# Patient Record
Sex: FEMALE | Race: WHITE | NOT HISPANIC OR LATINO | Employment: OTHER | URBAN - METROPOLITAN AREA
[De-identification: names, ages, dates, MRNs, and addresses within clinical notes are randomized per-mention and may not be internally consistent; named-entity substitution may affect disease eponyms.]

---

## 2017-02-01 ENCOUNTER — HOSPITAL ENCOUNTER (OUTPATIENT)
Dept: LAB | Facility: MEDICAL CENTER | Age: 62
End: 2017-02-01
Attending: OBSTETRICS & GYNECOLOGY
Payer: COMMERCIAL

## 2017-02-01 PROCEDURE — 87205 SMEAR GRAM STAIN: CPT

## 2017-02-01 PROCEDURE — 87070 CULTURE OTHR SPECIMN AEROBIC: CPT

## 2017-02-02 LAB
GRAM STN SPEC: NORMAL
SIGNIFICANT IND 70042: NORMAL
SITE SITE: NORMAL
SOURCE SOURCE: NORMAL

## 2017-02-04 LAB
BACTERIA GENITAL AEROBE CULT: NORMAL
GRAM STN SPEC: NORMAL
SIGNIFICANT IND 70042: NORMAL
SITE SITE: NORMAL
SOURCE SOURCE: NORMAL

## 2017-03-09 ENCOUNTER — HOSPITAL ENCOUNTER (OUTPATIENT)
Dept: LAB | Facility: MEDICAL CENTER | Age: 62
End: 2017-03-09
Attending: NURSE PRACTITIONER
Payer: COMMERCIAL

## 2017-03-09 LAB
ALBUMIN SERPL BCP-MCNC: 4 G/DL (ref 3.2–4.9)
ALBUMIN/GLOB SERPL: 1.4 G/DL
ALP SERPL-CCNC: 88 U/L (ref 30–99)
ALT SERPL-CCNC: 12 U/L (ref 2–50)
ANION GAP SERPL CALC-SCNC: 6 MMOL/L (ref 0–11.9)
AST SERPL-CCNC: 18 U/L (ref 12–45)
BASOPHILS # BLD AUTO: 0.02 K/UL (ref 0–0.12)
BASOPHILS NFR BLD AUTO: 0.4 % (ref 0–1.8)
BILIRUB SERPL-MCNC: 0.9 MG/DL (ref 0.1–1.5)
BUN SERPL-MCNC: 19 MG/DL (ref 8–22)
CALCIUM SERPL-MCNC: 9.3 MG/DL (ref 8.5–10.5)
CHLORIDE SERPL-SCNC: 108 MMOL/L (ref 96–112)
CHOLEST SERPL-MCNC: 183 MG/DL (ref 100–199)
CO2 SERPL-SCNC: 28 MMOL/L (ref 20–33)
CREAT SERPL-MCNC: 1.02 MG/DL (ref 0.5–1.4)
EOSINOPHIL # BLD: 0.18 K/UL (ref 0–0.51)
EOSINOPHIL NFR BLD AUTO: 3.8 % (ref 0–6.9)
ERYTHROCYTE [DISTWIDTH] IN BLOOD BY AUTOMATED COUNT: 42.9 FL (ref 35.9–50)
EST. AVERAGE GLUCOSE BLD GHB EST-MCNC: 120 MG/DL
GLOBULIN SER CALC-MCNC: 2.9 G/DL (ref 1.9–3.5)
GLUCOSE SERPL-MCNC: 87 MG/DL (ref 65–99)
HBA1C MFR BLD: 5.8 % (ref 0–5.6)
HCT VFR BLD AUTO: 39.2 % (ref 37–47)
HCV AB S/CO SERPL IA: NEGATIVE
HDLC SERPL-MCNC: 45 MG/DL
HGB BLD-MCNC: 13.1 G/DL (ref 12–16)
IMM GRANULOCYTES # BLD AUTO: 0.01 K/UL (ref 0–0.11)
IMM GRANULOCYTES NFR BLD AUTO: 0.2 % (ref 0–0.9)
LDLC SERPL CALC-MCNC: 121 MG/DL
LYMPHOCYTES # BLD: 1.92 K/UL (ref 1–4.8)
LYMPHOCYTES NFR BLD AUTO: 40.8 % (ref 22–41)
MCH RBC QN AUTO: 31.1 PG (ref 27–33)
MCHC RBC AUTO-ENTMCNC: 33.4 G/DL (ref 33.6–35)
MCV RBC AUTO: 93.1 FL (ref 81.4–97.8)
MONOCYTES # BLD: 0.32 K/UL (ref 0–0.85)
MONOCYTES NFR BLD AUTO: 6.8 % (ref 0–13.4)
NEUTROPHILS # BLD: 2.26 K/UL (ref 2–7.15)
NEUTROPHILS NFR BLD AUTO: 48 % (ref 44–72)
NRBC # BLD AUTO: 0 K/UL
NRBC BLD-RTO: 0 /100 WBC
PLATELET # BLD AUTO: 219 K/UL (ref 164–446)
PMV BLD AUTO: 11.2 FL (ref 9–12.9)
POTASSIUM SERPL-SCNC: 4.2 MMOL/L (ref 3.6–5.5)
PROT SERPL-MCNC: 6.9 G/DL (ref 6–8.2)
RBC # BLD AUTO: 4.21 M/UL (ref 4.2–5.4)
SODIUM SERPL-SCNC: 142 MMOL/L (ref 135–145)
TRIGL SERPL-MCNC: 87 MG/DL (ref 0–149)
TSH SERPL DL<=0.005 MIU/L-ACNC: 2.44 UIU/ML (ref 0.3–3.7)
WBC # BLD AUTO: 4.7 K/UL (ref 4.8–10.8)

## 2017-03-09 PROCEDURE — 80053 COMPREHEN METABOLIC PANEL: CPT

## 2017-03-09 PROCEDURE — 84443 ASSAY THYROID STIM HORMONE: CPT

## 2017-03-09 PROCEDURE — 85025 COMPLETE CBC W/AUTO DIFF WBC: CPT

## 2017-03-09 PROCEDURE — 80061 LIPID PANEL: CPT

## 2017-03-09 PROCEDURE — 36415 COLL VENOUS BLD VENIPUNCTURE: CPT

## 2017-03-09 PROCEDURE — 83036 HEMOGLOBIN GLYCOSYLATED A1C: CPT

## 2017-03-09 PROCEDURE — 86803 HEPATITIS C AB TEST: CPT

## 2017-04-12 ENCOUNTER — HOSPITAL ENCOUNTER (OUTPATIENT)
Dept: RADIOLOGY | Facility: MEDICAL CENTER | Age: 62
End: 2017-04-12
Attending: OBSTETRICS & GYNECOLOGY
Payer: COMMERCIAL

## 2017-04-12 DIAGNOSIS — Z13.9 SCREENING: ICD-10-CM

## 2017-04-12 PROCEDURE — 77063 BREAST TOMOSYNTHESIS BI: CPT

## 2017-04-26 ENCOUNTER — APPOINTMENT (OUTPATIENT)
Dept: ADMISSIONS | Facility: MEDICAL CENTER | Age: 62
End: 2017-04-26
Attending: SURGERY
Payer: COMMERCIAL

## 2017-05-03 ENCOUNTER — HOSPITAL ENCOUNTER (OUTPATIENT)
Facility: MEDICAL CENTER | Age: 62
End: 2017-05-03
Attending: SURGERY | Admitting: SURGERY
Payer: COMMERCIAL

## 2017-05-03 VITALS
OXYGEN SATURATION: 95 % | HEIGHT: 66 IN | BODY MASS INDEX: 31.82 KG/M2 | SYSTOLIC BLOOD PRESSURE: 124 MMHG | WEIGHT: 197.97 LBS | DIASTOLIC BLOOD PRESSURE: 68 MMHG | TEMPERATURE: 97.8 F | RESPIRATION RATE: 16 BRPM

## 2017-05-03 PROBLEM — G56.01 CARPAL TUNNEL SYNDROME ON RIGHT: Status: ACTIVE | Noted: 2017-05-03

## 2017-05-03 PROCEDURE — A9270 NON-COVERED ITEM OR SERVICE: HCPCS

## 2017-05-03 PROCEDURE — 501838 HCHG SUTURE GENERAL: Performed by: SURGERY

## 2017-05-03 PROCEDURE — 160048 HCHG OR STATISTICAL LEVEL 1-5: Performed by: SURGERY

## 2017-05-03 PROCEDURE — 700101 HCHG RX REV CODE 250

## 2017-05-03 PROCEDURE — 160025 RECOVERY II MINUTES (STATS): Performed by: SURGERY

## 2017-05-03 PROCEDURE — 500881 HCHG PACK, EXTREMITY: Performed by: SURGERY

## 2017-05-03 PROCEDURE — 160029 HCHG SURGERY MINUTES - 1ST 30 MINS LEVEL 4: Performed by: SURGERY

## 2017-05-03 PROCEDURE — 502240 HCHG MISC OR SUPPLY RC 0272: Performed by: SURGERY

## 2017-05-03 PROCEDURE — 160009 HCHG ANES TIME/MIN: Performed by: SURGERY

## 2017-05-03 PROCEDURE — 700102 HCHG RX REV CODE 250 W/ 637 OVERRIDE(OP)

## 2017-05-03 PROCEDURE — 500761 HCHG KIT, CARPAL TUNNEL ENDOSCOPIC: Performed by: SURGERY

## 2017-05-03 PROCEDURE — 160046 HCHG PACU - 1ST 60 MINS PHASE II: Performed by: SURGERY

## 2017-05-03 PROCEDURE — 160035 HCHG PACU - 1ST 60 MINS PHASE I: Performed by: SURGERY

## 2017-05-03 PROCEDURE — 500070 HCHG BLADE, AGEE CARPAL TUNNEL: Performed by: SURGERY

## 2017-05-03 PROCEDURE — 160002 HCHG RECOVERY MINUTES (STAT): Performed by: SURGERY

## 2017-05-03 PROCEDURE — 700111 HCHG RX REV CODE 636 W/ 250 OVERRIDE (IP)

## 2017-05-03 RX ORDER — LIDOCAINE HYDROCHLORIDE 10 MG/ML
INJECTION, SOLUTION INFILTRATION; PERINEURAL
Status: COMPLETED
Start: 2017-05-03 | End: 2017-05-03

## 2017-05-03 RX ORDER — HYDROCODONE BITARTRATE AND ACETAMINOPHEN 5; 325 MG/1; MG/1
1-2 TABLET ORAL EVERY 4 HOURS PRN
Qty: 20 TAB | Refills: 0 | Status: SHIPPED | OUTPATIENT
Start: 2017-05-03 | End: 2018-04-18

## 2017-05-03 RX ORDER — IBUPROFEN 200 MG
200 TABLET ORAL EVERY 6 HOURS PRN
COMMUNITY
End: 2018-04-18

## 2017-05-03 RX ORDER — BUPIVACAINE HYDROCHLORIDE AND EPINEPHRINE 2.5; 5 MG/ML; UG/ML
INJECTION, SOLUTION EPIDURAL; INFILTRATION; INTRACAUDAL; PERINEURAL
Status: DISCONTINUED | OUTPATIENT
Start: 2017-05-03 | End: 2017-05-03 | Stop reason: HOSPADM

## 2017-05-03 RX ORDER — LIDOCAINE AND PRILOCAINE 25; 25 MG/G; MG/G
1 CREAM TOPICAL
Status: COMPLETED | OUTPATIENT
Start: 2017-05-03 | End: 2017-05-03

## 2017-05-03 RX ORDER — LIDOCAINE HYDROCHLORIDE 10 MG/ML
0.5 INJECTION, SOLUTION INFILTRATION; PERINEURAL
Status: COMPLETED | OUTPATIENT
Start: 2017-05-03 | End: 2017-05-03

## 2017-05-03 RX ORDER — SODIUM CHLORIDE, SODIUM LACTATE, POTASSIUM CHLORIDE, CALCIUM CHLORIDE 600; 310; 30; 20 MG/100ML; MG/100ML; MG/100ML; MG/100ML
1000 INJECTION, SOLUTION INTRAVENOUS
Status: COMPLETED | OUTPATIENT
Start: 2017-05-03 | End: 2017-05-03

## 2017-05-03 RX ORDER — OXYCODONE HCL 5 MG/5 ML
SOLUTION, ORAL ORAL
Status: COMPLETED
Start: 2017-05-03 | End: 2017-05-03

## 2017-05-03 RX ORDER — M-VIT,TX,IRON,MINS/CALC/FOLIC 27MG-0.4MG
1 TABLET ORAL DAILY
Status: ON HOLD | COMMUNITY
End: 2018-04-23

## 2017-05-03 RX ADMIN — OXYCODONE HYDROCHLORIDE 5 MG: 5 SOLUTION ORAL at 08:43

## 2017-05-03 RX ADMIN — SODIUM CHLORIDE, SODIUM LACTATE, POTASSIUM CHLORIDE, CALCIUM CHLORIDE 1000 ML: 600; 310; 30; 20 INJECTION, SOLUTION INTRAVENOUS at 06:45

## 2017-05-03 RX ADMIN — LIDOCAINE HYDROCHLORIDE 0.1 ML: 10 INJECTION, SOLUTION INFILTRATION; PERINEURAL at 06:45

## 2017-05-03 ASSESSMENT — PAIN SCALES - GENERAL
PAINLEVEL_OUTOF10: 2
PAINLEVEL_OUTOF10: 0
PAINLEVEL_OUTOF10: 1

## 2017-05-03 NOTE — DISCHARGE INSTRUCTIONS
ACTIVITY: Rest and take it easy for the first 24 hours.  A responsible adult is recommended to remain with you during that time.  It is normal to feel sleepy.  We encourage you to not do anything that requires balance, judgment or coordination.    MILD FLU-LIKE SYMPTOMS ARE NORMAL. YOU MAY EXPERIENCE GENERALIZED MUSCLE ACHES, THROAT IRRITATION, HEADACHE AND/OR SOME NAUSEA.    FOR 24 HOURS DO NOT:  Drive, operate machinery or run household appliances.  Drink beer or alcoholic beverages.   Make important decisions or sign legal documents.    DIET: To avoid nausea, slowly advance diet as tolerated, avoiding spicy or greasy foods for the first day.  Add more substantial food to your diet according to your physician's instructions.  Babies can be fed formula or breast milk as soon as they are hungry.  INCREASE FLUIDS AND FIBER TO AVOID CONSTIPATION.    SURGICAL DRESSING/BATHING & SPECIAL INSTRUCTIONS:     1) Okay to remove bandage in 4 days and get incision wet and cover with bandaids.     2) Elevate above heart and ice frequently for at least 2 weeks. Encourage sedentary use of hand and frequent moving of fingers to prevent stiffness.     3) No heavy lifting or grasping for at least 4 weeks.     4) No driving while on narcotic pain medications. Contact auto-insurance carrier for clearance to begin driving again.     5) Call MD or come to ER for any major concerns.    FOLLOW-UP APPOINTMENT:  A follow-up appointment should be arranged with your doctor ; call to schedule.    You should CALL YOUR PHYSICIAN if you develop:  Fever greater than 101 degrees F.  Pain not relieved by medication, or persistent nausea or vomiting.  Excessive bleeding (blood soaking through dressing) or unexpected drainage from the wound.  Extreme redness or swelling around the incision site, drainage of pus or foul smelling drainage.  Inability to urinate or empty your bladder within 8 hours.  Problems with breathing or chest pain.    You should  call 911 if you develop problems with breathing or chest pain.  If you are unable to contact your doctor or surgical center, you should go to the nearest emergency room or urgent care center.  Physician's telephone #: Dr. Edge [phone]    If any questions arise, call your doctor.  If your doctor is not available, please feel free to call the Surgical Center at [phone].  The Center is open Monday through Friday from 7AM to 7PM.  You can also call the HEALTH HOTLINE open 24 hours/day, 7 days/week and speak to a nurse at [phone], or toll free at [phone].    A registered nurse may call you a few days after your surgery to see how you are doing after your procedure.    MEDICATIONS: Resume taking daily medication.  Take prescribed pain medication with food.  If no medication is prescribed, you may take non-aspirin pain medication if needed.  PAIN MEDICATION CAN BE VERY CONSTIPATING.  Take a stool softener or laxative such as senokot, pericolace, or milk of magnesia if needed.    Prescription given for NORCO.  Last pain medication given at *8:43am*.    If your physician has prescribed pain medication that includes Acetaminophen (Tylenol), do not take additional Acetaminophen (Tylenol) while taking the prescribed medication.    Depression / Suicide Risk    As you are discharged from this [facility] facility, it is important to learn how to keep safe from harming yourself.    Recognize the warning signs:  · Abrupt changes in personality, positive or negative- including increase in energy   · Giving away possessions  · Change in eating patterns- significant weight changes-  positive or negative  · Change in sleeping patterns- unable to sleep or sleeping all the time   · Unwillingness or inability to communicate  · Depression  · Unusual sadness, discouragement and loneliness  · Talk of wanting to die  · Neglect of personal appearance   · Rebelliousness- reckless behavior  · Withdrawal from  people/activities they love  · Confusion- inability to concentrate     If you or a loved one observes any of these behaviors or has concerns about self-harm, here's what you can do:  · Talk about it- your feelings and reasons for harming yourself  · Remove any means that you might use to hurt yourself (examples: pills, rope, extension cords, firearm)  · Get professional help from the community (Mental Health, Substance Abuse, psychological counseling)  · Do not be alone:Call your Safe Contact- someone whom you trust who will be there for you.  · Call your local CRISIS HOTLINE 081-7607 or 584-434-9039  · Call your local Children's Mobile Crisis Response Team [place] (321) 431-8464 or www.[employer]  · Call the toll free National Suicide Prevention Hotlines   · National Suicide Prevention Lifeline 738-654-EMCQ (7722)  · National Hope Line Network 800-SUICIDE (379-4532)

## 2017-05-03 NOTE — OR NURSING
Respirations easy. Dressing clean and dry to right hand.  Right arm elevated with ice pack in place.  Patient wiggling fingers. Right fingers warm and pink with brisk cap refill.

## 2017-05-03 NOTE — IP AVS SNAPSHOT
5/3/2017    Zora Mcneil  [id number]  [place] [address]    Dear Zora:    [facility] wants to ensure your discharge home is safe and you or your loved ones have had all of your questions answered regarding your care after you leave the hospital.    Below is a list of resources and contact information should you have any questions regarding your hospital stay, follow-up instructions, or active medical symptoms.    Questions or Concerns Regarding… Contact   Medical Questions Related to Your Discharge  (7 days a week, 8am-5pm) Contact a Nurse Care Coordinator   [phone]   Medical Questions Not Related to Your Discharge  (24 hours a day / 7 days a week)  Contact the Nurse Health Line   [phone]    Medications or Discharge Instructions Refer to your discharge packet   or contact your [facility] Primary Care Provider   [phone]   Follow-up Appointment(s) Schedule your appointment via [employer]   or contact Scheduling [phone]   Billing Review your statement via [employer]  or contact Billing [phone]   Medical Records Review your records via [employer]   or contact Medical Records [phone]     You may receive a telephone call within two days of discharge. This call is to make certain you understand your discharge instructions and have the opportunity to have any questions answered. You can also easily access your medical information, test results and upcoming appointments via the [employer] free online health management tool. You can learn more and sign up at [employer]. For assistance setting up your [employer] account, please call [phone].    Once again, we want to ensure your discharge home is safe and that you have a clear understanding of any next steps in your care. If you have any questions or concerns, please do not hesitate to contact us, we are here for you. Thank you for choosing [facility] for your healthcare needs.    Sincerely,    Your [facility] Healthcare Team

## 2017-05-03 NOTE — OP REPORT
DATE OF SERVICE:  05/03/2017    DATE OF OPERATION:  05/03/2017    SURGEON:  Iván Edge MD    ASSISTANT:  Fermín Manzanares PA-C    PREOPERATIVE DIAGNOSIS:  Right carpal tunnel syndrome.    POSTOPERATIVE DIAGNOSIS:  Right carpal tunnel syndrome.    PROCEDURE:  Right endoscopic carpal tunnel release.    ANESTHESIOLOGIST:  Mekhi Quesada MD    ANESTHESIA:  General endotracheal anesthesia.    COMPLICATIONS:  None noted.    DRAINS:  None.    SPECIMENS:  None.    ESTIMATED BLOOD LOSS:  Minimal.    INDICATIONS:  The patient is a 61-year-old female well known to me through my   outpatient clinic for the above-mentioned diagnosis.  She believes and agrees   she has failed conservative treatment and is a candidate for the   above-mentioned procedure.  Prior to the procedure, she understood the risks,   benefits and alternatives to surgery.  She understood the risks to include,   but not be limited to the risk of infection requiring repeat surgery, bleeding   requiring blood transfusion, nerve, blood vessel, tendon injury requiring   repair, chronic pain, failure to alleviate her symptoms, requiring revision   surgery, DVT, pulmonary embolism, heart attack, stroke, even death.  Patient   states despite these risks, she wished to proceed with surgery.    DESCRIPTION OF PROCEDURE:  Patient was met in the preoperative holding area.    Her right hand was initialed as correct operative site.  She had an   opportunity to ask questions, all questions were answered and informed consent   was obtained.  Patient brought to the operating room and laid supine on the   operating table.  All bony and dependent prominences were well padded.    General endotracheal anesthesia was induced without noted complication.  Ancef   was administered for infection prophylaxis.  Right upper extremity was   prepped and draped in usual sterile fashion.  A formal time-out was performed,   which all parties agreed upon the correct patient,  procedure, and operative   site.  I began the procedure by using Esmarch to exsanguinate the extremity   and inflated the tourniquet to 250 mmHg. I then made approximately 1.5 cm   transverse incision just proximal to distal wrist crease over palmaris longus   that retracted radially.  I made a distally based flap in the antebrachial   fascia and then placed a small and large carpal canal dilators, placed the   synovial elevator, palpated the undersurface of the transverse carpal ligament   and hook of the hamate to verify the appropriate location and then introduced   the microwire single portal device and released the transverse carpal   ligament longitudinally in its entirety under direct visualization.  I then   removed the instruments and released the distal forearm antebrachial fascia   with tenotomy scissors, deflated the tourniquet, copiously irrigated the wound   with normal saline, closed the incision with interrupted 4-0 nylon suture,   covered with sterile Xeroform and a soft dressing.  The patient awoke from   anesthesia without noted complication and was transferred in stable condition   to PACU where she had no immediate post-term complaint.    POSTOPERATIVE PLAN:  The patient will be discharged home per same day   criteria, sedentary use of the upper extremity and follow up in clinic in   10-14 days for suture removal and wound check.       ____________________________________     MD ARIANA Clarke / JOSH    DD:  05/03/2017 08:37:38  DT:  05/03/2017 08:56:06    D#:  [id number]  Job#:  [id number]

## 2017-05-03 NOTE — IP AVS SNAPSHOT
" Home Care Instructions                                                                                                                Name:Zora Mcneil  Medical Record Number:[id number]  CSN: [id number]    YOB: 1955   Age: 61 y.o.  Sex: female  HT:1.676 m (5' 6\") WT: 89.8 kg (197 lb 15.6 oz)          Admit Date: 5/3/2017     Discharge Date:   Today's Date: 5/3/2017  Attending Doctor:  AYUSH Bustamante*                  Allergies:  Review of patient's allergies indicates no known allergies.                Discharge Instructions         ACTIVITY: Rest and take it easy for the first 24 hours.  A responsible adult is recommended to remain with you during that time.  It is normal to feel sleepy.  We encourage you to not do anything that requires balance, judgment or coordination.    MILD FLU-LIKE SYMPTOMS ARE NORMAL. YOU MAY EXPERIENCE GENERALIZED MUSCLE ACHES, THROAT IRRITATION, HEADACHE AND/OR SOME NAUSEA.    FOR 24 HOURS DO NOT:  Drive, operate machinery or run household appliances.  Drink beer or alcoholic beverages.   Make important decisions or sign legal documents.    DIET: To avoid nausea, slowly advance diet as tolerated, avoiding spicy or greasy foods for the first day.  Add more substantial food to your diet according to your physician's instructions.  Babies can be fed formula or breast milk as soon as they are hungry.  INCREASE FLUIDS AND FIBER TO AVOID CONSTIPATION.    SURGICAL DRESSING/BATHING & SPECIAL INSTRUCTIONS:     1) Okay to remove bandage in 4 days and get incision wet and cover with bandaids.     2) Elevate above heart and ice frequently for at least 2 weeks. Encourage sedentary use of hand and frequent moving of fingers to prevent stiffness.     3) No heavy lifting or grasping for at least 4 weeks.     4) No driving while on narcotic pain medications. Contact auto-insurance carrier for clearance to begin driving again.     5) Call MD or come to ER for any major " concerns.    FOLLOW-UP APPOINTMENT:  A follow-up appointment should be arranged with your doctor ; call to schedule.    You should CALL YOUR PHYSICIAN if you develop:  Fever greater than 101 degrees F.  Pain not relieved by medication, or persistent nausea or vomiting.  Excessive bleeding (blood soaking through dressing) or unexpected drainage from the wound.  Extreme redness or swelling around the incision site, drainage of pus or foul smelling drainage.  Inability to urinate or empty your bladder within 8 hours.  Problems with breathing or chest pain.    You should call 911 if you develop problems with breathing or chest pain.  If you are unable to contact your doctor or surgical center, you should go to the nearest emergency room or urgent care center.  Physician's telephone #: Dr. Edge [phone]    If any questions arise, call your doctor.  If your doctor is not available, please feel free to call the Surgical Center at [phone].  The Center is open Monday through Friday from 7AM to 7PM.  You can also call the [employer] HOTLINE open 24 hours/day, 7 days/week and speak to a nurse at [phone], or toll free at [phone].    A registered nurse may call you a few days after your surgery to see how you are doing after your procedure.    MEDICATIONS: Resume taking daily medication.  Take prescribed pain medication with food.  If no medication is prescribed, you may take non-aspirin pain medication if needed.  PAIN MEDICATION CAN BE VERY CONSTIPATING.  Take a stool softener or laxative such as senokot, pericolace, or milk of magnesia if needed.    Prescription given for NORCO.  Last pain medication given at *8:43am*.    If your physician has prescribed pain medication that includes Acetaminophen (Tylenol), do not take additional Acetaminophen (Tylenol) while taking the prescribed medication.    Depression / Suicide Risk    As you are discharged from this [facility] facility, it is important to  learn how to keep safe from harming yourself.    Recognize the warning signs:  · Abrupt changes in personality, positive or negative- including increase in energy   · Giving away possessions  · Change in eating patterns- significant weight changes-  positive or negative  · Change in sleeping patterns- unable to sleep or sleeping all the time   · Unwillingness or inability to communicate  · Depression  · Unusual sadness, discouragement and loneliness  · Talk of wanting to die  · Neglect of personal appearance   · Rebelliousness- reckless behavior  · Withdrawal from people/activities they love  · Confusion- inability to concentrate     If you or a loved one observes any of these behaviors or has concerns about self-harm, here's what you can do:  · Talk about it- your feelings and reasons for harming yourself  · Remove any means that you might use to hurt yourself (examples: pills, rope, extension cords, firearm)  · Get professional help from the community (Mental Health, Substance Abuse, psychological counseling)  · Do not be alone:Call your Safe Contact- someone whom you trust who will be there for you.  · Call your local CRISIS HOTLINE 238-4919 or 128-520-1844  · Call your local Children's Mobile Crisis Response Team [place] (754) 821-1408 or www.[employer]  · Call the toll free National Suicide Prevention Hotlines   · National Suicide Prevention Lifeline 478-157-VMNP (5131)  · National Hope Line Network 800-SUICIDE (058-7599)       Medication List      START taking these medications        Instructions    Morning Afternoon Evening Bedtime    hydrocodone-acetaminophen 5-325 MG Tabs per tablet   Commonly known as:  NORCO        Take 1-2 Tabs by mouth every four hours as needed.   Dose:  1-2 Tab                          CONTINUE taking these medications        Instructions    Morning Afternoon Evening Bedtime    CALCIUM 500 +D PO        Take 1 Tab by mouth every day.   Dose:  1 Tab                         ibuprofen 200 MG Tabs   Commonly known as:  MOTRIN        Take 200 mg by mouth every 6 hours as needed.   Dose:  200 mg                        MULTI VITAMIN PO        Take 1 Tab by mouth every day.   Dose:  1 Tab                        NON SPECIFIED        Take 1 Tab by mouth 3 times a day. Antibiotic - took for 5 days   Dose:  1 Tab                        NYQUIL COLD & FLU PO        Take  by mouth.                        POLYMYXIN B-TRIMETHOPRIM OP        by Ophthalmic route. Takes q 3 hours w/a till 4/28/2017 bilateral eyes                        simvastatin 20 MG Tabs   Commonly known as:  ZOCOR        Take 0.5 Tabs by mouth every bedtime.   Dose:  10 mg                        therapeutic multivitamin-minerals Tabs        Take 1 Tab by mouth every day.   Dose:  1 Tab                             Where to Get Your Medications      You can get these medications from any pharmacy     Bring a paper prescription for each of these medications    - hydrocodone-acetaminophen 5-325 MG Tabs per tablet            Medication Information     Above and/or attached are the medications your physician expects you to take upon discharge. Review all of your home medications and newly ordered medications with your doctor and/or pharmacist. Follow medication instructions as directed by your doctor and/or pharmacist. Please keep your medication list with you and share with your physician. Update the information when medications are discontinued, doses are changed, or new medications (including over-the-counter products) are added; and carry medication information at all times in the event of emergency situations.        Resources     Quit Smoking / Tobacco Use:    I understand the use of any tobacco products increases my chance of suffering from future heart disease or stroke and could cause other illnesses which may shorten my life. Quitting the use of tobacco products is the single most important thing I can do to improve my health.  For further information on smoking / tobacco cessation call a Toll Free Quit Line at 1-878.554.5962 (*[employer] [place]) or 1-739.187.5267 (American Lung Association) or you can access the web based program at www.lungusa.org.    [place] Tobacco Users Help Line:  (662) 743-8244       Toll Free: 1-124.261.3336  Quit Tobacco Program [facility] [phone]    Crisis Hotline:    [place] Crisis Hotline:  7-781-MEKZDZD or 1-605.525.7897    [place] Crisis Hotline:    1-186.533.9782 or 282-704-9080    Discharge Survey:   Thank you for choosing [facility]. We hope we did everything we could to make your hospital stay a pleasant one. You may be receiving a survey and we would appreciate your time and participation in answering the questions. Your input is very valuable to us in our efforts to improve our service to our patients and their families.            Signatures     My signature on this form indicates that:    1. I acknowledge receipt and understanding of these Home Care Instruction.  2. My questions regarding this information have been answered to my satisfaction.  3. I have formulated a plan with my discharge nurse to obtain my prescribed medications for home.    __________________________________      __________________________________                   Patient Signature                                 Guardian/Responsible Adult Signature      __________________________________                 __________       ________                       Nurse Signature                                               Date                 Time

## 2017-05-03 NOTE — OR SURGEON
Immediate Post-Operative Note      PreOp Diagnosis: right CTS    PostOp Diagnosis: same     Procedure(s):  CARPAL TUNNEL ENDOSCOPIC - Wound Class: Clean    Surgeon(s):  Iván Edge M.D.    Anesthesiologist/Type of Anesthesia:  Anesthesiologist: Mekhi Quesada M.D./General    Surgical Staff:  Circulator: Bere Sparks R.N.  Scrub Person: Majo Prescott  First Assist: Fermín Manzanares PA-C    Specimen: none    Estimated Blood Loss: minimal    Findings: see dictation    Complications: none noted.         5/3/2017 8:21 AM Iván Edge

## 2017-05-03 NOTE — OR NURSING
Pt verbalizes readiness for discharge. Instructions reviewed with pt and pt's . Pt refusing wheelchair stating she would like to walk out. Gait steady;  at side. No further needs.

## 2017-06-21 ENCOUNTER — HOSPITAL ENCOUNTER (OUTPATIENT)
Dept: LAB | Facility: MEDICAL CENTER | Age: 62
End: 2017-06-21
Attending: PHYSICIAN ASSISTANT
Payer: COMMERCIAL

## 2017-06-21 LAB
BASOPHILS # BLD AUTO: 0.3 % (ref 0–1.8)
BASOPHILS # BLD: 0.02 K/UL (ref 0–0.12)
CRP SERPL HS-MCNC: 1.19 MG/DL (ref 0–0.75)
EOSINOPHIL # BLD AUTO: 0.1 K/UL (ref 0–0.51)
EOSINOPHIL NFR BLD: 1.4 % (ref 0–6.9)
ERYTHROCYTE [DISTWIDTH] IN BLOOD BY AUTOMATED COUNT: 42.6 FL (ref 35.9–50)
ERYTHROCYTE [SEDIMENTATION RATE] IN BLOOD BY WESTERGREN METHOD: 41 MM/HOUR (ref 0–30)
HCT VFR BLD AUTO: 37.8 % (ref 37–47)
HGB BLD-MCNC: 12.7 G/DL (ref 12–16)
IMM GRANULOCYTES # BLD AUTO: 0.02 K/UL (ref 0–0.11)
IMM GRANULOCYTES NFR BLD AUTO: 0.3 % (ref 0–0.9)
LYMPHOCYTES # BLD AUTO: 1.62 K/UL (ref 1–4.8)
LYMPHOCYTES NFR BLD: 22.8 % (ref 22–41)
MCH RBC QN AUTO: 30.9 PG (ref 27–33)
MCHC RBC AUTO-ENTMCNC: 33.6 G/DL (ref 33.6–35)
MCV RBC AUTO: 92 FL (ref 81.4–97.8)
MONOCYTES # BLD AUTO: 0.49 K/UL (ref 0–0.85)
MONOCYTES NFR BLD AUTO: 6.9 % (ref 0–13.4)
NEUTROPHILS # BLD AUTO: 4.87 K/UL (ref 2–7.15)
NEUTROPHILS NFR BLD: 68.3 % (ref 44–72)
NRBC # BLD AUTO: 0 K/UL
NRBC BLD AUTO-RTO: 0 /100 WBC
PLATELET # BLD AUTO: 211 K/UL (ref 164–446)
PMV BLD AUTO: 11.2 FL (ref 9–12.9)
RBC # BLD AUTO: 4.11 M/UL (ref 4.2–5.4)
WBC # BLD AUTO: 7.1 K/UL (ref 4.8–10.8)

## 2017-06-21 PROCEDURE — 85652 RBC SED RATE AUTOMATED: CPT

## 2017-06-21 PROCEDURE — 36415 COLL VENOUS BLD VENIPUNCTURE: CPT

## 2017-06-21 PROCEDURE — 86140 C-REACTIVE PROTEIN: CPT

## 2017-06-21 PROCEDURE — 85025 COMPLETE CBC W/AUTO DIFF WBC: CPT

## 2017-07-19 ENCOUNTER — RX ONLY (OUTPATIENT)
Age: 62
Setting detail: RX ONLY
End: 2017-07-19

## 2017-07-19 PROBLEM — D22.5 MELANOCYTIC NEVI OF TRUNK: Status: ACTIVE | Noted: 2017-07-19

## 2017-08-02 PROBLEM — D49.2 NEOPLASM OF UNSPECIFIED BEHAVIOR OF BONE, SOFT TISSUE, AND SKIN: Status: RESOLVED | Noted: 2017-07-19 | Resolved: 2017-08-02

## 2017-09-05 PROBLEM — D17.24 BENIGN LIPOMATOUS NEOPLASM OF SKIN AND SUBCUTANEOUS TISSUE OF LEFT LEG: Status: ACTIVE | Noted: 2017-09-05

## 2018-04-02 ENCOUNTER — APPOINTMENT (OUTPATIENT)
Dept: RADIOLOGY | Facility: MEDICAL CENTER | Age: 63
End: 2018-04-02
Attending: ORTHOPAEDIC SURGERY
Payer: COMMERCIAL

## 2018-04-02 DIAGNOSIS — M17.12 OSTEOARTHRITIS OF LEFT KNEE, UNSPECIFIED OSTEOARTHRITIS TYPE: ICD-10-CM

## 2018-04-02 PROCEDURE — 73721 MRI JNT OF LWR EXTRE W/O DYE: CPT | Mod: LT

## 2018-04-18 ENCOUNTER — APPOINTMENT (OUTPATIENT)
Dept: ADMISSIONS | Facility: MEDICAL CENTER | Age: 63
End: 2018-04-18
Attending: ORTHOPAEDIC SURGERY
Payer: COMMERCIAL

## 2018-04-18 DIAGNOSIS — Z01.810 PRE-OPERATIVE CARDIOVASCULAR EXAMINATION: ICD-10-CM

## 2018-04-18 DIAGNOSIS — Z01.812 PRE-OPERATIVE LABORATORY EXAMINATION: ICD-10-CM

## 2018-04-18 LAB
EKG IMPRESSION: NORMAL
ERYTHROCYTE [DISTWIDTH] IN BLOOD BY AUTOMATED COUNT: 43.4 FL (ref 35.9–50)
HCT VFR BLD AUTO: 38.7 % (ref 37–47)
HGB BLD-MCNC: 12.6 G/DL (ref 12–16)
MCH RBC QN AUTO: 30.2 PG (ref 27–33)
MCHC RBC AUTO-ENTMCNC: 32.6 G/DL (ref 33.6–35)
MCV RBC AUTO: 92.8 FL (ref 81.4–97.8)
PLATELET # BLD AUTO: 218 K/UL (ref 164–446)
PMV BLD AUTO: 11.3 FL (ref 9–12.9)
RBC # BLD AUTO: 4.17 M/UL (ref 4.2–5.4)
WBC # BLD AUTO: 6.7 K/UL (ref 4.8–10.8)

## 2018-04-18 PROCEDURE — 36415 COLL VENOUS BLD VENIPUNCTURE: CPT

## 2018-04-18 PROCEDURE — 93005 ELECTROCARDIOGRAM TRACING: CPT

## 2018-04-18 PROCEDURE — 93010 ELECTROCARDIOGRAM REPORT: CPT | Performed by: INTERNAL MEDICINE

## 2018-04-18 PROCEDURE — 85027 COMPLETE CBC AUTOMATED: CPT

## 2018-04-18 RX ORDER — IBUPROFEN 200 MG
200 TABLET ORAL EVERY 6 HOURS PRN
COMMUNITY
End: 2020-12-20

## 2018-04-18 NOTE — OR NURSING
Pre admit apt: Pt. Instructed to continue regularly prescribed medications through day before surgery.  Instructed to take the following medications, the day of surgery, with a sip of water per anesthesia protocol:None

## 2018-04-23 ENCOUNTER — HOSPITAL ENCOUNTER (OUTPATIENT)
Facility: MEDICAL CENTER | Age: 63
End: 2018-04-23
Attending: ORTHOPAEDIC SURGERY | Admitting: ORTHOPAEDIC SURGERY
Payer: COMMERCIAL

## 2018-04-23 VITALS
HEART RATE: 74 BPM | SYSTOLIC BLOOD PRESSURE: 138 MMHG | OXYGEN SATURATION: 95 % | TEMPERATURE: 96.8 F | WEIGHT: 205.69 LBS | DIASTOLIC BLOOD PRESSURE: 80 MMHG | RESPIRATION RATE: 16 BRPM | BODY MASS INDEX: 33.06 KG/M2 | HEIGHT: 66 IN

## 2018-04-23 PROCEDURE — 700111 HCHG RX REV CODE 636 W/ 250 OVERRIDE (IP): Performed by: ORTHOPAEDIC SURGERY

## 2018-04-23 PROCEDURE — 160002 HCHG RECOVERY MINUTES (STAT): Performed by: ORTHOPAEDIC SURGERY

## 2018-04-23 PROCEDURE — 160035 HCHG PACU - 1ST 60 MINS PHASE I: Performed by: ORTHOPAEDIC SURGERY

## 2018-04-23 PROCEDURE — 700111 HCHG RX REV CODE 636 W/ 250 OVERRIDE (IP)

## 2018-04-23 PROCEDURE — 160025 RECOVERY II MINUTES (STATS): Performed by: ORTHOPAEDIC SURGERY

## 2018-04-23 PROCEDURE — 700102 HCHG RX REV CODE 250 W/ 637 OVERRIDE(OP)

## 2018-04-23 PROCEDURE — 160048 HCHG OR STATISTICAL LEVEL 1-5: Performed by: ORTHOPAEDIC SURGERY

## 2018-04-23 PROCEDURE — A9270 NON-COVERED ITEM OR SERVICE: HCPCS

## 2018-04-23 PROCEDURE — 700102 HCHG RX REV CODE 250 W/ 637 OVERRIDE(OP): Performed by: ORTHOPAEDIC SURGERY

## 2018-04-23 PROCEDURE — 700101 HCHG RX REV CODE 250

## 2018-04-23 PROCEDURE — 160041 HCHG SURGERY MINUTES - EA ADDL 1 MIN LEVEL 4: Performed by: ORTHOPAEDIC SURGERY

## 2018-04-23 PROCEDURE — 700105 HCHG RX REV CODE 258: Performed by: ORTHOPAEDIC SURGERY

## 2018-04-23 PROCEDURE — 160029 HCHG SURGERY MINUTES - 1ST 30 MINS LEVEL 4: Performed by: ORTHOPAEDIC SURGERY

## 2018-04-23 PROCEDURE — 160046 HCHG PACU - 1ST 60 MINS PHASE II: Performed by: ORTHOPAEDIC SURGERY

## 2018-04-23 PROCEDURE — A6223 GAUZE >16<=48 NO W/SAL W/O B: HCPCS | Performed by: ORTHOPAEDIC SURGERY

## 2018-04-23 PROCEDURE — 502580 HCHG PACK, KNEE ARTHROSCOPY: Performed by: ORTHOPAEDIC SURGERY

## 2018-04-23 PROCEDURE — A9270 NON-COVERED ITEM OR SERVICE: HCPCS | Performed by: ORTHOPAEDIC SURGERY

## 2018-04-23 PROCEDURE — 160009 HCHG ANES TIME/MIN: Performed by: ORTHOPAEDIC SURGERY

## 2018-04-23 RX ORDER — HYDROCODONE BITARTRATE AND ACETAMINOPHEN 7.5; 325 MG/1; MG/1
2 TABLET ORAL EVERY 4 HOURS PRN
Status: DISCONTINUED | OUTPATIENT
Start: 2018-04-23 | End: 2018-04-23 | Stop reason: HOSPADM

## 2018-04-23 RX ORDER — HALOPERIDOL 5 MG/ML
1 INJECTION INTRAMUSCULAR EVERY 6 HOURS PRN
Status: DISCONTINUED | OUTPATIENT
Start: 2018-04-23 | End: 2018-04-23 | Stop reason: HOSPADM

## 2018-04-23 RX ORDER — SCOLOPAMINE TRANSDERMAL SYSTEM 1 MG/1
1 PATCH, EXTENDED RELEASE TRANSDERMAL
Status: DISCONTINUED | OUTPATIENT
Start: 2018-04-23 | End: 2018-04-23 | Stop reason: HOSPADM

## 2018-04-23 RX ORDER — DEXAMETHASONE SODIUM PHOSPHATE 4 MG/ML
4 INJECTION, SOLUTION INTRA-ARTICULAR; INTRALESIONAL; INTRAMUSCULAR; INTRAVENOUS; SOFT TISSUE
Status: DISCONTINUED | OUTPATIENT
Start: 2018-04-23 | End: 2018-04-23 | Stop reason: HOSPADM

## 2018-04-23 RX ORDER — HYDROCODONE BITARTRATE AND ACETAMINOPHEN 7.5; 325 MG/1; MG/1
1 TABLET ORAL EVERY 4 HOURS PRN
Status: DISCONTINUED | OUTPATIENT
Start: 2018-04-23 | End: 2018-04-23 | Stop reason: HOSPADM

## 2018-04-23 RX ORDER — EPINEPHRINE 1 MG/ML
INJECTION INTRAMUSCULAR; INTRAVENOUS; SUBCUTANEOUS
Status: DISCONTINUED | OUTPATIENT
Start: 2018-04-23 | End: 2018-04-23 | Stop reason: HOSPADM

## 2018-04-23 RX ORDER — ONDANSETRON 2 MG/ML
4 INJECTION INTRAMUSCULAR; INTRAVENOUS EVERY 4 HOURS PRN
Status: DISCONTINUED | OUTPATIENT
Start: 2018-04-23 | End: 2018-04-23 | Stop reason: HOSPADM

## 2018-04-23 RX ORDER — SODIUM CHLORIDE, SODIUM LACTATE, POTASSIUM CHLORIDE, CALCIUM CHLORIDE 600; 310; 30; 20 MG/100ML; MG/100ML; MG/100ML; MG/100ML
INJECTION, SOLUTION INTRAVENOUS CONTINUOUS
Status: DISCONTINUED | OUTPATIENT
Start: 2018-04-23 | End: 2018-04-23 | Stop reason: HOSPADM

## 2018-04-23 RX ORDER — MIDAZOLAM HYDROCHLORIDE 1 MG/ML
INJECTION INTRAMUSCULAR; INTRAVENOUS
Status: DISCONTINUED
Start: 2018-04-23 | End: 2018-04-23 | Stop reason: HOSPADM

## 2018-04-23 RX ORDER — DIPHENHYDRAMINE HYDROCHLORIDE 50 MG/ML
25 INJECTION INTRAMUSCULAR; INTRAVENOUS EVERY 6 HOURS PRN
Status: DISCONTINUED | OUTPATIENT
Start: 2018-04-23 | End: 2018-04-23 | Stop reason: HOSPADM

## 2018-04-23 RX ORDER — HYDROMORPHONE HYDROCHLORIDE 2 MG/ML
0.5 INJECTION, SOLUTION INTRAMUSCULAR; INTRAVENOUS; SUBCUTANEOUS
Status: DISCONTINUED | OUTPATIENT
Start: 2018-04-23 | End: 2018-04-23 | Stop reason: HOSPADM

## 2018-04-23 RX ADMIN — HYDROCODONE BITARTRATE AND ACETAMINOPHEN 15 ML: 7.5; 325 SOLUTION ORAL at 14:50

## 2018-04-23 RX ADMIN — SODIUM CHLORIDE, POTASSIUM CHLORIDE, SODIUM LACTATE AND CALCIUM CHLORIDE 1000 ML: 600; 310; 30; 20 INJECTION, SOLUTION INTRAVENOUS at 13:15

## 2018-04-23 ASSESSMENT — PAIN SCALES - GENERAL
PAINLEVEL_OUTOF10: 2
PAINLEVEL_OUTOF10: ASSUMED PAIN PRESENT
PAINLEVEL_OUTOF10: 2
PAINLEVEL_OUTOF10: 3
PAINLEVEL_OUTOF10: 0

## 2018-04-23 NOTE — OP REPORT
DATE OF SERVICE:  04/23/2018    PREOPERATIVE DIAGNOSIS:  Medial meniscus tear, left knee with moderate   osteoarthritis.    POSTOPERATIVE DIAGNOSIS:  Medial meniscus tear, left knee with moderate   osteoarthritis.    PROCEDURES:  Examination under anesthesia, arthroscopy, partial medial   meniscectomy, left knee.    SURGEON:  Tom Moore MD    ANESTHESIA:  General per LMA.    ANESTHESIOLOGIST:  Danny Gamino MD    OPERATIVE INDICATIONS:  The patient is a 62-year-old white female who has had   ongoing and worsening left knee pain.  She did have x-rays showing some mild   osteoarthritis and had an attempted cortisone injection, which did not result   in significant improvement.  An MRI then showed a medial meniscus tear in   addition to her degenerative arthritis.  It was felt that an arthroscopy was   indicated to try and maintain activity in this active woman.    PROCEDURE IN DETAIL:  The patient was brought to the operating room and placed   on table in a supine position where a satisfactory general anesthetic agent   was administered per LMA.  The patient was given 2 grams of Ancef IV prior to   beginning the procedure.  The left lower extremity revealed a range of motion   of 0, 0, 135 with no pathological laxity about the cruciates or collaterals.    The left lower extremity was placed in arthroscopic leg mcarthur with the right   lower extremity supported on pillows.  Left lower extremity was prepped with   gel prep, draped free in a sterile fashion.  A superomedial portal was created   and the inflow cannula was inserted.  An anterolateral portal was created and   a 30-degree arthroscope was inserted.  A comprehensive arthroscopy of the   knee was performed.  Suprapatellar pouch was free of defects.  The   undersurface of the patella showed some grade II changes and the trochlea some   grade II-III changes.  There were no loose flaps of cartilage in these areas.    The medial and lateral gutter  showed no pathology.  The medial compartment   was entered.  There were diffuse grade III changes on the medial femoral   condyle with areas of grade III and grade IV changes on the medial tibial   plateau.  There was a complex tear of the medial meniscus from the posterior   horn to the mid medial portion.  The ACL was visualized and noted to be   intact.  The lateral compartment was entered.  The lateral articular surfaces   and lateral meniscus were grossly intact.  An anteromedial portal was created   and a probe was inserted.  Probing the lateral meniscus and ACL revealed no   further pathology.  Probing the medial meniscus revealed the tear as above.  A   partial medial meniscectomy was performed with baskets and a shaver removing   approximately 40% of the medial meniscus from the posterior horn to the   anteromedial corner.  This was smoothed to a stable meniscal rim with a   shaver.  There were some small loose flaps of articular cartilage on the   medial femoral condyle and these were also smoothed back down to a stable   meniscal rim.  The posteromedial and posterolateral compartments reviewed   through the notch with no further evidence of pathology.  The joint was   drained of fluid and a sterile dressing of Adaptic flats, cast padding, and   Ace wraps was applied.  The patient was awakened, extubated in the operating   room, taken to recovery room in stable condition.  Sponge and needle counts   were correct.  There were no identified intraoperative complications.       ____________________________________     MD DOMINGUEZ Linton / JOSH    DD:  04/23/2018 14:22:37  DT:  04/23/2018 14:39:35    D#:  [id number]  Job#:  [id number]

## 2018-04-23 NOTE — OR NURSING
1501 Arrived from pacu alert x 3 spont resp unlabored, dressing herself with her friend that is her ride home.   1530 meets criteria for dc stage 2, verb. Understanding of dc instructions with her friend as cg.

## 2018-04-23 NOTE — DISCHARGE INSTRUCTIONS
ACTIVITY: Rest and take it easy for the first 24 hours.  A responsible adult is recommended to remain with you during that time.  It is normal to feel sleepy.  We encourage you to not do anything that requires balance, judgment or coordination.    MILD FLU-LIKE SYMPTOMS ARE NORMAL. YOU MAY EXPERIENCE GENERALIZED MUSCLE ACHES, THROAT IRRITATION, HEADACHE AND/OR SOME NAUSEA.    FOR 24 HOURS DO NOT:  Drive, operate machinery or run household appliances.  Drink beer or alcoholic beverages.   Make important decisions or sign legal documents.    SPECIAL INSTRUCTIONS: Activity is to be weight bearing as tolerated on operative extremity. Ice and elevate.    DIET: To avoid nausea, slowly advance diet as tolerated, avoiding spicy or greasy foods for the first day.  Add more substantial food to your diet according to your physician's instructions.  INCREASE FLUIDS AND FIBER TO AVOID CONSTIPATION.    SURGICAL DRESSING/BATHING: Keep dressings clean dry. Do not remove dressings until advised otherwise by surgeon. Shower as per surgeon's instruction.    FOLLOW-UP APPOINTMENT: Tomorrow      You should CALL YOUR PHYSICIAN if you develop:  Fever greater than 101 degrees F.  Pain not relieved by medication, or persistent nausea or vomiting.  Excessive bleeding (blood soaking through dressing) or unexpected drainage from the wound.  Extreme redness or swelling around the incision site, drainage of pus or foul smelling drainage.  Inability to urinate or empty your bladder within 8 hours.    You should call 911 if you develop problems with breathing or chest pain.    If you are unable to contact your doctor or surgical center, you should go to the nearest emergency room or urgent care center.      Physician's telephone #: Dr. Moore [phone]    If any questions arise, call your doctor.  If your doctor is not available, please feel free to call the Surgical Center at [phone].  The Center is open Monday through Friday from  7AM to 7PM.      You can also call the HEALTH HOTLINE open 24 hours/day, 7 days/week and speak to a nurse at [phone], or toll free at [phone].    A registered nurse may call you a few days after your surgery to see how you are doing after your procedure.    MEDICATIONS: Resume taking daily medication.  Take prescribed pain medication with food.  If no medication is prescribed, you may take non-aspirin pain medication if needed.  PAIN MEDICATION CAN BE VERY CONSTIPATING.  Take a stool softener or laxative such as senokot, pericolace, or milk of magnesia if needed.    Prescription given pre op.      Last pain medication (Hydrocodone 7.5mg/Acetaminaphen 325mg) given at 2:50 pm.    If your physician has prescribed pain medication that includes Acetaminophen (Tylenol), do not take additional Acetaminophen (Tylenol) while taking the prescribed medication.    Depression / Suicide Risk    As you are discharged from this [facility] facility, it is important to learn how to keep safe from harming yourself.    Recognize the warning signs:  · Abrupt changes in personality, positive or negative- including increase in energy   · Giving away possessions  · Change in eating patterns- significant weight changes-  positive or negative  · Change in sleeping patterns- unable to sleep or sleeping all the time   · Unwillingness or inability to communicate  · Depression  · Unusual sadness, discouragement and loneliness  · Talk of wanting to die  · Neglect of personal appearance   · Rebelliousness- reckless behavior  · Withdrawal from people/activities they love  · Confusion- inability to concentrate     If you or a loved one observes any of these behaviors or has concerns about self-harm, here's what you can do:  · Talk about it- your feelings and reasons for harming yourself  · Remove any means that you might use to hurt yourself (examples: pills, rope, extension cords, firearm)  · Get professional help from the  community (Mental Health, Substance Abuse, psychological counseling)  · Do not be alone:Call your Safe Contact- someone whom you trust who will be there for you.  · Call your local CRISIS HOTLINE 825-0598 or 566-652-4063  · Call your local Children's Mobile Crisis Response Team [place] (113) 969-6229 or www.ShipServ  · Call the toll free National Suicide Prevention Hotlines   · National Suicide Prevention Lifeline 159-062-ERMZ (2902)  · National Hope Line Network 800-SUICIDE (123-1540)

## 2018-04-23 NOTE — OR SURGEON
Immediate Post OP Note    PreOp Diagnosis: Left MMT, moderate OA    PostOp Diagnosis: same    Procedure(s):  KNEE ARTHROSCOPY - Wound Class: Clean  MEDIAL MENISCECTOMY - PARTIAL AND CHARLES - Wound Class: Clean    Surgeon(s):  Tom Moore M.D.    Anesthesiologist/Type of Anesthesia:  Anesthesiologist: Danny Gamino M.D./General    Surgical Staff:  Circulator: May Dejesus R.N.  Scrub Person: Nguyen Narvaez    Specimens removed if any:  * No specimens in log *    Estimated Blood Loss: minimal    Findings: MMT    Complications: none        4/23/2018 2:21 PM Tom Moore M.D.

## 2018-04-23 NOTE — OR NURSING
1418: To PACU post left knee arthroscopy. OPA in place. Strong pulse noted.  1430: OPA dc'd, breathing is spontaneous and unlabored. Denies pain or nausea at this time.  1455: Remains pain/nausea free. Taking sips w/o issue. Meets criteria for stage ll.

## 2019-12-05 ENCOUNTER — HOSPITAL ENCOUNTER (OUTPATIENT)
Dept: LAB | Facility: MEDICAL CENTER | Age: 64
End: 2019-12-05
Attending: FAMILY MEDICINE
Payer: MEDICARE

## 2019-12-05 LAB
ALBUMIN SERPL BCP-MCNC: 4.2 G/DL (ref 3.2–4.9)
ALBUMIN/GLOB SERPL: 1.6 G/DL
ALP SERPL-CCNC: 80 U/L (ref 30–99)
ALT SERPL-CCNC: 16 U/L (ref 2–50)
ANION GAP SERPL CALC-SCNC: 9 MMOL/L (ref 0–11.9)
AST SERPL-CCNC: 19 U/L (ref 12–45)
BASOPHILS # BLD AUTO: 0.5 % (ref 0–1.8)
BASOPHILS # BLD: 0.03 K/UL (ref 0–0.12)
BILIRUB SERPL-MCNC: 1 MG/DL (ref 0.1–1.5)
BUN SERPL-MCNC: 19 MG/DL (ref 8–22)
CALCIUM SERPL-MCNC: 9 MG/DL (ref 8.5–10.5)
CHLORIDE SERPL-SCNC: 107 MMOL/L (ref 96–112)
CHOLEST SERPL-MCNC: 235 MG/DL (ref 100–199)
CO2 SERPL-SCNC: 25 MMOL/L (ref 20–33)
CREAT SERPL-MCNC: 0.86 MG/DL (ref 0.5–1.4)
EOSINOPHIL # BLD AUTO: 0.17 K/UL (ref 0–0.51)
EOSINOPHIL NFR BLD: 2.7 % (ref 0–6.9)
ERYTHROCYTE [DISTWIDTH] IN BLOOD BY AUTOMATED COUNT: 45.1 FL (ref 35.9–50)
EST. AVERAGE GLUCOSE BLD GHB EST-MCNC: 111 MG/DL
FASTING STATUS PATIENT QL REPORTED: NORMAL
GLOBULIN SER CALC-MCNC: 2.7 G/DL (ref 1.9–3.5)
GLUCOSE SERPL-MCNC: 88 MG/DL (ref 65–99)
HBA1C MFR BLD: 5.5 % (ref 0–5.6)
HCT VFR BLD AUTO: 41.1 % (ref 37–47)
HDLC SERPL-MCNC: 54 MG/DL
HGB BLD-MCNC: 13.4 G/DL (ref 12–16)
IMM GRANULOCYTES # BLD AUTO: 0.02 K/UL (ref 0–0.11)
IMM GRANULOCYTES NFR BLD AUTO: 0.3 % (ref 0–0.9)
LDLC SERPL CALC-MCNC: 165 MG/DL
LYMPHOCYTES # BLD AUTO: 2.04 K/UL (ref 1–4.8)
LYMPHOCYTES NFR BLD: 32.2 % (ref 22–41)
MCH RBC QN AUTO: 30.3 PG (ref 27–33)
MCHC RBC AUTO-ENTMCNC: 32.6 G/DL (ref 33.6–35)
MCV RBC AUTO: 93 FL (ref 81.4–97.8)
MONOCYTES # BLD AUTO: 0.44 K/UL (ref 0–0.85)
MONOCYTES NFR BLD AUTO: 7 % (ref 0–13.4)
NEUTROPHILS # BLD AUTO: 3.63 K/UL (ref 2–7.15)
NEUTROPHILS NFR BLD: 57.3 % (ref 44–72)
NRBC # BLD AUTO: 0 K/UL
NRBC BLD-RTO: 0 /100 WBC
PLATELET # BLD AUTO: 255 K/UL (ref 164–446)
PMV BLD AUTO: 10.6 FL (ref 9–12.9)
POTASSIUM SERPL-SCNC: 4 MMOL/L (ref 3.6–5.5)
PROT SERPL-MCNC: 6.9 G/DL (ref 6–8.2)
RBC # BLD AUTO: 4.42 M/UL (ref 4.2–5.4)
SODIUM SERPL-SCNC: 141 MMOL/L (ref 135–145)
TRIGL SERPL-MCNC: 82 MG/DL (ref 0–149)
WBC # BLD AUTO: 6.3 K/UL (ref 4.8–10.8)

## 2019-12-05 PROCEDURE — 83036 HEMOGLOBIN GLYCOSYLATED A1C: CPT | Mod: GA

## 2019-12-05 PROCEDURE — 36415 COLL VENOUS BLD VENIPUNCTURE: CPT

## 2019-12-05 PROCEDURE — 85025 COMPLETE CBC W/AUTO DIFF WBC: CPT

## 2019-12-05 PROCEDURE — 80053 COMPREHEN METABOLIC PANEL: CPT

## 2019-12-05 PROCEDURE — 80061 LIPID PANEL: CPT

## 2020-09-24 ENCOUNTER — HOSPITAL ENCOUNTER (OUTPATIENT)
Dept: LAB | Facility: MEDICAL CENTER | Age: 65
End: 2020-09-24
Attending: NURSE PRACTITIONER
Payer: MEDICARE

## 2020-09-24 LAB
25(OH)D3 SERPL-MCNC: 36 NG/ML (ref 30–100)
ALBUMIN SERPL BCP-MCNC: 4.1 G/DL (ref 3.2–4.9)
ALBUMIN/GLOB SERPL: 1.6 G/DL
ALP SERPL-CCNC: 81 U/L (ref 30–99)
ALT SERPL-CCNC: 14 U/L (ref 2–50)
ANION GAP SERPL CALC-SCNC: 16 MMOL/L (ref 7–16)
AST SERPL-CCNC: 19 U/L (ref 12–45)
BASOPHILS # BLD AUTO: 0.3 % (ref 0–1.8)
BASOPHILS # BLD: 0.02 K/UL (ref 0–0.12)
BILIRUB SERPL-MCNC: 0.3 MG/DL (ref 0.1–1.5)
BUN SERPL-MCNC: 25 MG/DL (ref 8–22)
CALCIUM SERPL-MCNC: 9 MG/DL (ref 8.5–10.5)
CHLORIDE SERPL-SCNC: 105 MMOL/L (ref 96–112)
CHOLEST SERPL-MCNC: 199 MG/DL (ref 100–199)
CO2 SERPL-SCNC: 20 MMOL/L (ref 20–33)
CREAT SERPL-MCNC: 0.78 MG/DL (ref 0.5–1.4)
EOSINOPHIL # BLD AUTO: 0.12 K/UL (ref 0–0.51)
EOSINOPHIL NFR BLD: 1.9 % (ref 0–6.9)
ERYTHROCYTE [DISTWIDTH] IN BLOOD BY AUTOMATED COUNT: 42 FL (ref 35.9–50)
FASTING STATUS PATIENT QL REPORTED: NORMAL
GLOBULIN SER CALC-MCNC: 2.6 G/DL (ref 1.9–3.5)
GLUCOSE SERPL-MCNC: 92 MG/DL (ref 65–99)
HCT VFR BLD AUTO: 40.6 % (ref 37–47)
HDLC SERPL-MCNC: 47 MG/DL
HGB BLD-MCNC: 13.5 G/DL (ref 12–16)
IMM GRANULOCYTES # BLD AUTO: 0.02 K/UL (ref 0–0.11)
IMM GRANULOCYTES NFR BLD AUTO: 0.3 % (ref 0–0.9)
LDLC SERPL CALC-MCNC: 141 MG/DL
LYMPHOCYTES # BLD AUTO: 1.51 K/UL (ref 1–4.8)
LYMPHOCYTES NFR BLD: 23.7 % (ref 22–41)
MCH RBC QN AUTO: 30 PG (ref 27–33)
MCHC RBC AUTO-ENTMCNC: 33.3 G/DL (ref 33.6–35)
MCV RBC AUTO: 90.2 FL (ref 81.4–97.8)
MONOCYTES # BLD AUTO: 0.36 K/UL (ref 0–0.85)
MONOCYTES NFR BLD AUTO: 5.7 % (ref 0–13.4)
NEUTROPHILS # BLD AUTO: 4.34 K/UL (ref 2–7.15)
NEUTROPHILS NFR BLD: 68.1 % (ref 44–72)
NRBC # BLD AUTO: 0 K/UL
NRBC BLD-RTO: 0 /100 WBC
PLATELET # BLD AUTO: 230 K/UL (ref 164–446)
PMV BLD AUTO: 11.7 FL (ref 9–12.9)
POTASSIUM SERPL-SCNC: 4 MMOL/L (ref 3.6–5.5)
PROT SERPL-MCNC: 6.7 G/DL (ref 6–8.2)
RBC # BLD AUTO: 4.5 M/UL (ref 4.2–5.4)
SODIUM SERPL-SCNC: 141 MMOL/L (ref 135–145)
T3FREE SERPL-MCNC: 2.1 PG/ML (ref 2–4.4)
T4 FREE SERPL-MCNC: 1.33 NG/DL (ref 0.93–1.7)
TRIGL SERPL-MCNC: 53 MG/DL (ref 0–149)
TSH SERPL DL<=0.005 MIU/L-ACNC: 1.52 UIU/ML (ref 0.38–5.33)
WBC # BLD AUTO: 6.4 K/UL (ref 4.8–10.8)

## 2020-09-24 PROCEDURE — 82306 VITAMIN D 25 HYDROXY: CPT | Mod: GA

## 2020-09-24 PROCEDURE — 85025 COMPLETE CBC W/AUTO DIFF WBC: CPT

## 2020-09-24 PROCEDURE — 80061 LIPID PANEL: CPT

## 2020-09-24 PROCEDURE — 36415 COLL VENOUS BLD VENIPUNCTURE: CPT

## 2020-09-24 PROCEDURE — 83036 HEMOGLOBIN GLYCOSYLATED A1C: CPT | Mod: GA

## 2020-09-24 PROCEDURE — 84443 ASSAY THYROID STIM HORMONE: CPT

## 2020-09-24 PROCEDURE — 86800 THYROGLOBULIN ANTIBODY: CPT

## 2020-09-24 PROCEDURE — 84481 FREE ASSAY (FT-3): CPT

## 2020-09-24 PROCEDURE — 84439 ASSAY OF FREE THYROXINE: CPT

## 2020-09-24 PROCEDURE — 80053 COMPREHEN METABOLIC PANEL: CPT

## 2020-09-27 LAB — THYROGLOB AB SERPL-ACNC: <0.9 IU/ML (ref 0–4)

## 2020-12-20 PROCEDURE — 99283 EMERGENCY DEPT VISIT LOW MDM: CPT

## 2020-12-20 PROCEDURE — 36415 COLL VENOUS BLD VENIPUNCTURE: CPT

## 2020-12-20 ASSESSMENT — FIBROSIS 4 INDEX: FIB4 SCORE: 1.48

## 2020-12-21 ENCOUNTER — HOSPITAL ENCOUNTER (EMERGENCY)
Facility: MEDICAL CENTER | Age: 65
End: 2020-12-21
Attending: EMERGENCY MEDICINE
Payer: MEDICARE

## 2020-12-21 ENCOUNTER — HOSPITAL ENCOUNTER (OUTPATIENT)
Dept: RADIOLOGY | Facility: MEDICAL CENTER | Age: 65
End: 2020-12-21
Attending: EMERGENCY MEDICINE
Payer: MEDICARE

## 2020-12-21 VITALS
BODY MASS INDEX: 32.74 KG/M2 | OXYGEN SATURATION: 97 % | HEART RATE: 66 BPM | RESPIRATION RATE: 16 BRPM | TEMPERATURE: 97.5 F | HEIGHT: 66 IN | WEIGHT: 203.71 LBS | SYSTOLIC BLOOD PRESSURE: 134 MMHG | DIASTOLIC BLOOD PRESSURE: 76 MMHG

## 2020-12-21 DIAGNOSIS — M79.661 RIGHT CALF PAIN: ICD-10-CM

## 2020-12-21 DIAGNOSIS — I82.401 ACUTE DEEP VEIN THROMBOSIS (DVT) OF RIGHT LOWER EXTREMITY, UNSPECIFIED VEIN (HCC): ICD-10-CM

## 2020-12-21 LAB
ALBUMIN SERPL BCP-MCNC: 4 G/DL (ref 3.2–4.9)
ALBUMIN/GLOB SERPL: 1.7 G/DL
ALP SERPL-CCNC: 97 U/L (ref 30–99)
ALT SERPL-CCNC: 15 U/L (ref 2–50)
ANION GAP SERPL CALC-SCNC: 11 MMOL/L (ref 7–16)
APTT PPP: 24.8 SEC (ref 24.7–36)
AST SERPL-CCNC: 18 U/L (ref 12–45)
BASOPHILS # BLD AUTO: 0.4 % (ref 0–1.8)
BASOPHILS # BLD: 0.03 K/UL (ref 0–0.12)
BILIRUB SERPL-MCNC: 0.4 MG/DL (ref 0.1–1.5)
BUN SERPL-MCNC: 28 MG/DL (ref 8–22)
CALCIUM SERPL-MCNC: 8.9 MG/DL (ref 8.4–10.2)
CHLORIDE SERPL-SCNC: 105 MMOL/L (ref 96–112)
CO2 SERPL-SCNC: 22 MMOL/L (ref 20–33)
CREAT SERPL-MCNC: 0.76 MG/DL (ref 0.5–1.4)
EOSINOPHIL # BLD AUTO: 0.2 K/UL (ref 0–0.51)
EOSINOPHIL NFR BLD: 2.6 % (ref 0–6.9)
ERYTHROCYTE [DISTWIDTH] IN BLOOD BY AUTOMATED COUNT: 43.5 FL (ref 35.9–50)
GLOBULIN SER CALC-MCNC: 2.4 G/DL (ref 1.9–3.5)
GLUCOSE SERPL-MCNC: 97 MG/DL (ref 65–99)
HCT VFR BLD AUTO: 36.9 % (ref 37–47)
HGB BLD-MCNC: 12.2 G/DL (ref 12–16)
IMM GRANULOCYTES # BLD AUTO: 0.02 K/UL (ref 0–0.11)
IMM GRANULOCYTES NFR BLD AUTO: 0.3 % (ref 0–0.9)
INR PPP: 1.06 (ref 0.87–1.13)
LYMPHOCYTES # BLD AUTO: 2.44 K/UL (ref 1–4.8)
LYMPHOCYTES NFR BLD: 31.8 % (ref 22–41)
MCH RBC QN AUTO: 29.3 PG (ref 27–33)
MCHC RBC AUTO-ENTMCNC: 33.1 G/DL (ref 33.6–35)
MCV RBC AUTO: 88.5 FL (ref 81.4–97.8)
MONOCYTES # BLD AUTO: 0.56 K/UL (ref 0–0.85)
MONOCYTES NFR BLD AUTO: 7.3 % (ref 0–13.4)
NEUTROPHILS # BLD AUTO: 4.43 K/UL (ref 2–7.15)
NEUTROPHILS NFR BLD: 57.6 % (ref 44–72)
NRBC # BLD AUTO: 0 K/UL
NRBC BLD-RTO: 0 /100 WBC
PLATELET # BLD AUTO: 199 K/UL (ref 164–446)
PMV BLD AUTO: 10.6 FL (ref 9–12.9)
POTASSIUM SERPL-SCNC: 3.8 MMOL/L (ref 3.6–5.5)
PROT SERPL-MCNC: 6.4 G/DL (ref 6–8.2)
PROTHROMBIN TIME: 13.5 SEC (ref 12–14.6)
RBC # BLD AUTO: 4.17 M/UL (ref 4.2–5.4)
SODIUM SERPL-SCNC: 138 MMOL/L (ref 135–145)
WBC # BLD AUTO: 7.7 K/UL (ref 4.8–10.8)

## 2020-12-21 PROCEDURE — 85610 PROTHROMBIN TIME: CPT

## 2020-12-21 PROCEDURE — 80053 COMPREHEN METABOLIC PANEL: CPT

## 2020-12-21 PROCEDURE — 85025 COMPLETE CBC W/AUTO DIFF WBC: CPT

## 2020-12-21 PROCEDURE — 36415 COLL VENOUS BLD VENIPUNCTURE: CPT

## 2020-12-21 PROCEDURE — 85730 THROMBOPLASTIN TIME PARTIAL: CPT

## 2020-12-21 PROCEDURE — 93971 EXTREMITY STUDY: CPT | Mod: RT

## 2020-12-21 PROCEDURE — A9270 NON-COVERED ITEM OR SERVICE: HCPCS | Performed by: EMERGENCY MEDICINE

## 2020-12-21 PROCEDURE — 700102 HCHG RX REV CODE 250 W/ 637 OVERRIDE(OP): Performed by: EMERGENCY MEDICINE

## 2020-12-21 RX ADMIN — RIVAROXABAN 15 MG: 15 TABLET, FILM COATED ORAL at 02:53

## 2020-12-21 NOTE — ED NOTES
Blood samples have been collected as prescribed  without any complications, and transferred to lab.

## 2020-12-21 NOTE — DISCHARGE INSTRUCTIONS
Apply warm moist compresses to the calf 3-4 times daily or as needed  Take the Xarelto 15 mg twice a day for the next 3 weeks and then 20 mg daily after that.  Dr. Walton will let you know how long you need to stay on the blood thinners.  Stop using all hormone products  Try using Replens vaginal moisturizer which is over-the-counter.  Or you can talk to your gynecologist and see if there are any other products.  Return if any chest pain or shortness of breath.

## 2020-12-21 NOTE — ED PROVIDER NOTES
"CHIEF COMPLAINT  Chief Complaint   Patient presents with   • Leg Pain     Right calf just below the knee. started 3 days ago. Believes its a blood clot. She had one four years ago.   • Leg Swelling       HPI  Zora Mcneil is a 65 y.o. female who presents tonight with her  with a chief complaint of right calf pain that started about 3 days ago.  She has had no history of trauma.  She has not been sedentary.  She denies any chest pain or shortness of breath.  The only thing different is that she has been placed on Premarin vaginal cream over the last month.  She had a history of previous DVT in the left leg in 2014 and was treated with Xarelto for a year for that.  She has had no fever, chills or any other symptoms.    REVIEW OF SYSTEMS  See HPI for further details. All other system reviews are negative.    PAST MEDICAL HISTORY  Past Medical History:   Diagnosis Date   • Arthritis     fingers   • Blood clotting disorder (HCC) 2014    xarelto x 1 year, left leg   • Bowel habit changes    • Bronchitis    • Cold 04/18/2018    \"Minor cold one week ago\" Denies productive cough, SOB   • Dental disorder     upper frontal bridge   • Hyperlipidemia    • Obesity    • Pain 04/2018    left knee, back   • Psychiatric problem     depression   • Unspecified urinary incontinence     stress       FAMILY HISTORY  Family History   Problem Relation Age of Onset   • Cancer Father         lung   • Arthritis Mother         rheumatoid arthritis       SOCIAL HISTORY  Social History     Socioeconomic History   • Marital status:      Spouse name: Not on file   • Number of children: Not on file   • Years of education: Not on file   • Highest education level: Not on file   Occupational History   • Not on file   Social Needs   • Financial resource strain: Not on file   • Food insecurity     Worry: Not on file     Inability: Not on file   • Transportation needs     Medical: Not on file     Non-medical: Not on file "   Tobacco Use   • Smoking status: Former Smoker     Packs/day: 1.00     Years: 25.00     Pack years: 25.00     Quit date: 1980     Years since quittin.0   • Smokeless tobacco: Never Used   Substance and Sexual Activity   • Alcohol use: Yes     Comment: 2 per week   • Drug use: Yes     Comment: Occassional marijuana edible   • Sexual activity: Yes     Comment: , 1 child, teacher   Lifestyle   • Physical activity     Days per week: Not on file     Minutes per session: Not on file   • Stress: Not on file   Relationships   • Social connections     Talks on phone: Not on file     Gets together: Not on file     Attends Restoration service: Not on file     Active member of club or organization: Not on file     Attends meetings of clubs or organizations: Not on file     Relationship status: Not on file   • Intimate partner violence     Fear of current or ex partner: Not on file     Emotionally abused: Not on file     Physically abused: Not on file     Forced sexual activity: Not on file   Other Topics Concern   • Not on file   Social History Narrative   • Not on file       SURGICAL HISTORY  Past Surgical History:   Procedure Laterality Date   • KNEE ARTHROSCOPY Left 2018    Procedure: KNEE ARTHROSCOPY;  Surgeon: Tom Moore M.D.;  Location: [facility];  Service: Orthopedics   • MEDIAL MENISCECTOMY Left 2018    Procedure: MEDIAL MENISCECTOMY - PARTIAL;  Surgeon: Tom Moore M.D.;  Location: [facility];  Service: Orthopedics   • CARPAL TUNNEL ENDOSCOPIC Right 5/3/2017    Procedure: CARPAL TUNNEL ENDOSCOPIC;  Surgeon: Iván Edge M.D.;  Location: [facility];  Service:    • LESION EXCISION ORTHO  2014    Performed by Fermín Edge M.D. at SURGERY SAME DAY [facility] ORS   • BONE SPUR EXCISION  2011    Performed by REGINE PERERA at SURGERY SAME DAY [facility] ORS   • TOE ARTHROPLASTY  2011    Performed by DILMA  "REGINE WARREN at SURGERY SAME DAY [facility] ORS   • COLONOSCOPY  2009,recheck 10   • HEMORRHOIDECTOMY     • KNEE ARTHROSCOPY     • OTHER ORTHOPEDIC SURGERY      toe joint surgery       CURRENT MEDICATIONS  See nurses notes    ALLERGIES  No Known Allergies    PHYSICAL EXAM  VITAL SIGNS: /76   Pulse 66   Temp 36.4 °C (97.5 °F) (Temporal)   Resp 16   Ht 1.676 m (5' 6\")   Wt 92.4 kg (203 lb 11.3 oz)   SpO2 97%   BMI 32.88 kg/m²     Constitutional: Patient is well developed, well nourished in mild distress.   HENT: Normocephalic, Oropharynx moist.   Eyes: PERRL, EOMI  Neck: Supple  Normal range of motion. No tenderness.  Cardiovascular: Normal heart rate and rhythm. No murmur  Thorax & Lungs: Clear and equal breath sounds with good excursion. No respiratory distress.  Abdomen: Bowel sounds normal in all four quadrants. Soft,nontender.  Skin: Warm, Dry, No erythema.  Extremities: Peripheral pulses 4/4 , right calf tenderness upon palpation with no increased erythema, warmth or signs of trauma.  She has good popliteal pedal and posterior tibial pulses, good capillary fill, good sensation with no range of motion of the toes.  Musculoskeletal: Normal range of motion in all major joints.  Neurologic: Alert & oriented x 3, Normal motor function, Normal sensory function,  DTR's 4/4 bilaterally.  Psychiatric: Affect normal, Judgment normal, Mood normal.     Results for orders placed or performed during the hospital encounter of 12/21/20   CBC WITH DIFFERENTIAL   Result Value Ref Range    WBC 7.7 4.8 - 10.8 K/uL    RBC 4.17 (L) 4.20 - 5.40 M/uL    Hemoglobin 12.2 12.0 - 16.0 g/dL    Hematocrit 36.9 (L) 37.0 - 47.0 %    MCV 88.5 81.4 - 97.8 fL    MCH 29.3 27.0 - 33.0 pg    MCHC 33.1 (L) 33.6 - 35.0 g/dL    RDW 43.5 35.9 - 50.0 fL    Platelet Count 199 164 - 446 K/uL    MPV 10.6 9.0 - 12.9 fL    Neutrophils-Polys 57.60 44.00 - 72.00 %    Lymphocytes 31.80 22.00 - 41.00 %    Monocytes 7.30 0.00 - 13.40 %    Eosinophils 2.60 " 0.00 - 6.90 %    Basophils 0.40 0.00 - 1.80 %    Immature Granulocytes 0.30 0.00 - 0.90 %    Nucleated RBC 0.00 /100 WBC    Neutrophils (Absolute) 4.43 2.00 - 7.15 K/uL    Lymphs (Absolute) 2.44 1.00 - 4.80 K/uL    Monos (Absolute) 0.56 0.00 - 0.85 K/uL    Eos (Absolute) 0.20 0.00 - 0.51 K/uL    Baso (Absolute) 0.03 0.00 - 0.12 K/uL    Immature Granulocytes (abs) 0.02 0.00 - 0.11 K/uL    NRBC (Absolute) 0.00 K/uL   COMP METABOLIC PANEL   Result Value Ref Range    Sodium 138 135 - 145 mmol/L    Potassium 3.8 3.6 - 5.5 mmol/L    Chloride 105 96 - 112 mmol/L    Co2 22 20 - 33 mmol/L    Anion Gap 11.0 7.0 - 16.0    Glucose 97 65 - 99 mg/dL    Bun 28 (H) 8 - 22 mg/dL    Creatinine 0.76 0.50 - 1.40 mg/dL    Calcium 8.9 8.4 - 10.2 mg/dL    AST(SGOT) 18 12 - 45 U/L    ALT(SGPT) 15 2 - 50 U/L    Alkaline Phosphatase 97 30 - 99 U/L    Total Bilirubin 0.4 0.1 - 1.5 mg/dL    Albumin 4.0 3.2 - 4.9 g/dL    Total Protein 6.4 6.0 - 8.2 g/dL    Globulin 2.4 1.9 - 3.5 g/dL    A-G Ratio 1.7 g/dL   PROTHROMBIN TIME (INR)   Result Value Ref Range    PT 13.5 12.0 - 14.6 sec    INR 1.06 0.87 - 1.13   APTT   Result Value Ref Range    APTT 24.8 24.7 - 36.0 sec   ESTIMATED GFR   Result Value Ref Range    GFR If African American >60 >60 mL/min/1.73 m 2    GFR If Non African American >60 >60 mL/min/1.73 m 2         RADIOLOGY/PROCEDURES  US-EXTREMITY VENOUS LOWER UNILAT RIGHT   Final Result      DVT in the right gastrocnemius vein.      These findings were discussed with NUNO ORTIZ on 12/21/2020 1:55 AM.            COURSE & MEDICAL DECISION MAKING  Pertinent Labs & Imaging studies reviewed. (See chart for details)  Ultrasound was performed showing a DVT in the right gastrocnemius vein.  Laboratories were then drawn on the patient were found to be unremarkable.  PT/INR within normal limits.  Patient will be placed on Xarelto and was given her first dose here in the ER 15 mg.  I gave her a prescription to go home with and she is to follow-up  with her primary care doctor within the next 2 to 3 weeks for recheck.  She is to do warm compresses to her leg, warm soaks in Epsom salts, return if any chest pain or shortness of breath.  She is discharged in stable and improved condition.    FINAL IMPRESSION  1.  Acute DVT right gastrocnemius vein  2.  Right calf pain  3.         Electronically signed by: Nidhi Fernandez D.O., 12/21/2020 5:39 [facility] Provider Note

## 2020-12-24 LAB
EST. AVERAGE GLUCOSE BLD GHB EST-MCNC: NORMAL MG/DL
HBA1C MFR BLD: NORMAL % (ref 0–5.6)

## 2021-03-03 DIAGNOSIS — Z23 NEED FOR VACCINATION: ICD-10-CM

## 2021-07-06 PROBLEM — S52.101D: Status: ACTIVE | Noted: 2021-07-06

## 2022-01-20 ENCOUNTER — HOSPITAL ENCOUNTER (OUTPATIENT)
Dept: RADIOLOGY | Facility: MEDICAL CENTER | Age: 67
End: 2022-01-20
Attending: NURSE PRACTITIONER
Payer: MEDICARE

## 2022-01-20 ENCOUNTER — OFFICE VISIT (OUTPATIENT)
Dept: DERMATOLOGY | Facility: IMAGING CENTER | Age: 67
End: 2022-01-20
Payer: MEDICARE

## 2022-01-20 DIAGNOSIS — L60.3 NAIL DYSTROPHY: ICD-10-CM

## 2022-01-20 DIAGNOSIS — Z12.83 SKIN CANCER SCREENING: ICD-10-CM

## 2022-01-20 DIAGNOSIS — D17.21 LIPOMA OF RIGHT UPPER EXTREMITY: ICD-10-CM

## 2022-01-20 DIAGNOSIS — L81.4 LENTIGO: ICD-10-CM

## 2022-01-20 DIAGNOSIS — D22.9 NEVUS: ICD-10-CM

## 2022-01-20 DIAGNOSIS — M81.0 SENILE OSTEOPOROSIS: ICD-10-CM

## 2022-01-20 DIAGNOSIS — L21.9 SEBORRHEA: ICD-10-CM

## 2022-01-20 DIAGNOSIS — L30.4 INTERTRIGO: ICD-10-CM

## 2022-01-20 DIAGNOSIS — L57.0 ACTINIC KERATOSIS: ICD-10-CM

## 2022-01-20 DIAGNOSIS — L82.1 SEBORRHEIC KERATOSIS: ICD-10-CM

## 2022-01-20 DIAGNOSIS — D18.01 CHERRY ANGIOMA: ICD-10-CM

## 2022-01-20 DIAGNOSIS — L90.8 SKIN AGING: ICD-10-CM

## 2022-01-20 PROCEDURE — 17000 DESTRUCT PREMALG LESION: CPT | Performed by: DERMATOLOGY

## 2022-01-20 PROCEDURE — 99203 OFFICE O/P NEW LOW 30 MIN: CPT | Mod: 25 | Performed by: DERMATOLOGY

## 2022-01-20 PROCEDURE — 77080 DXA BONE DENSITY AXIAL: CPT

## 2022-01-20 NOTE — PROGRESS NOTES
CC: skin lesion    Subjective: new patient here for skin exam.     HPI/location: lt cheek  Time present: 1 mth    Painful lesion: No  Itching lesion: No  Enlarging lesion: No  Anything make it better or worse?    Started around xmas, may have been blemish that doesn't heal.     History of skin cancer: No  History of precancers/actinic keratoses: Yes, Details: poss on nose  History of biopsies:Yes, Details: poss on nose doesn't remember if it was frozen off or not   History of blistering/severe sunburns:No  Family history of skin cancer:No  Family history of atypical moles:No    ROS: no fevers/chills. No itch.  No cough  Relevant PMH: hx DVT, past rivaroxaban therapy  Social: former smoker    PE: Gen:WDWN female in NAD. Skin: Scalp/face/eyes/lips/neck/chest/back/arms/legs/hands/feet/buttocks - without suspicious lesions noted.  Genitals exam declined  -thin hk papule left cheek  -scaling ears  -subcut soft tumor, inner right upper arm, approx 2cm  -scattered hyperpigmented macules/papules appearing on torso/extremities, appearing benign without suspicious features  -pink macerated skin, minimal infra breast and infra abd  -thickened toenail, right 2nd      A/P: seborrhea, ears:   -rec otc hct cream/lotrimin cream BID PRN    Intertrigo: mild  -rec keep cool, OTC    Nevi: benign appearing:  -Reviewed skin cancer detection/prevention  -RTC PRN growth/changes/concerning features    Lentigos/SKs: benign  -reassurance  -reviewed skin cancer detection/prevention    Cherry angioma: benign    AKs: left cheek  -counseled on diagnosis and treatment, including risks/benefits/alternatives of cyrotherapy  -LN2 25 sec X 2 cycles X 1lesions  -f/u if persists at 1 month    Lipoma: right arm:   -b/r  -surgery future PRN    Nail dystrophy/ consider Onychomycosis:   Topical OTC treatment of toenail fungus - use daily until nail grows out normally - can be as long as 1 year.  1) file the nail surface lightly with an emery board - one  reserved only for use on affected nails to limit additional spreading of infection.  2) apply amlactin cream to thin the nail plate surface  3) apply topical lamisil cream (antifungal)  4) apply joy's vapor rub (tincture of thymol ingredient found to be effective for treatment of nail fungus in some studies)            F.u 1 year/PRN    I have reviewed medications relevant to my specialty.

## 2022-06-01 ENCOUNTER — HOSPITAL ENCOUNTER (OUTPATIENT)
Dept: LAB | Facility: MEDICAL CENTER | Age: 67
End: 2022-06-01
Attending: NURSE PRACTITIONER
Payer: MEDICARE

## 2022-06-01 LAB
25(OH)D3 SERPL-MCNC: 35 NG/ML (ref 30–100)
ALBUMIN SERPL BCP-MCNC: 4.4 G/DL (ref 3.2–4.9)
ALBUMIN/GLOB SERPL: 1.7 G/DL
ALP SERPL-CCNC: 78 U/L (ref 30–99)
ALT SERPL-CCNC: 16 U/L (ref 2–50)
ANION GAP SERPL CALC-SCNC: 10 MMOL/L (ref 7–16)
AST SERPL-CCNC: 16 U/L (ref 12–45)
BASOPHILS # BLD AUTO: 0.3 % (ref 0–1.8)
BASOPHILS # BLD: 0.01 K/UL (ref 0–0.12)
BILIRUB SERPL-MCNC: 0.7 MG/DL (ref 0.1–1.5)
BUN SERPL-MCNC: 29 MG/DL (ref 8–22)
CALCIUM SERPL-MCNC: 9.3 MG/DL (ref 8.5–10.5)
CHLORIDE SERPL-SCNC: 106 MMOL/L (ref 96–112)
CHOLEST SERPL-MCNC: 207 MG/DL (ref 100–199)
CO2 SERPL-SCNC: 25 MMOL/L (ref 20–33)
CREAT SERPL-MCNC: 0.77 MG/DL (ref 0.5–1.4)
EOSINOPHIL # BLD AUTO: 0.11 K/UL (ref 0–0.51)
EOSINOPHIL NFR BLD: 2.8 % (ref 0–6.9)
ERYTHROCYTE [DISTWIDTH] IN BLOOD BY AUTOMATED COUNT: 44.7 FL (ref 35.9–50)
EST. AVERAGE GLUCOSE BLD GHB EST-MCNC: 114 MG/DL
FASTING STATUS PATIENT QL REPORTED: NORMAL
GFR SERPLBLD CREATININE-BSD FMLA CKD-EPI: 84 ML/MIN/1.73 M 2
GLOBULIN SER CALC-MCNC: 2.6 G/DL (ref 1.9–3.5)
GLUCOSE SERPL-MCNC: 95 MG/DL (ref 65–99)
HBA1C MFR BLD: 5.6 % (ref 4–5.6)
HCT VFR BLD AUTO: 40.1 % (ref 37–47)
HDLC SERPL-MCNC: 44 MG/DL
HGB BLD-MCNC: 13.3 G/DL (ref 12–16)
IMM GRANULOCYTES # BLD AUTO: 0.02 K/UL (ref 0–0.11)
IMM GRANULOCYTES NFR BLD AUTO: 0.5 % (ref 0–0.9)
LDLC SERPL CALC-MCNC: 149 MG/DL
LYMPHOCYTES # BLD AUTO: 1.31 K/UL (ref 1–4.8)
LYMPHOCYTES NFR BLD: 33.3 % (ref 22–41)
MCH RBC QN AUTO: 30 PG (ref 27–33)
MCHC RBC AUTO-ENTMCNC: 33.2 G/DL (ref 33.6–35)
MCV RBC AUTO: 90.5 FL (ref 81.4–97.8)
MONOCYTES # BLD AUTO: 0.32 K/UL (ref 0–0.85)
MONOCYTES NFR BLD AUTO: 8.1 % (ref 0–13.4)
NEUTROPHILS # BLD AUTO: 2.16 K/UL (ref 2–7.15)
NEUTROPHILS NFR BLD: 55 % (ref 44–72)
NRBC # BLD AUTO: 0 K/UL
NRBC BLD-RTO: 0 /100 WBC
PLATELET # BLD AUTO: 219 K/UL (ref 164–446)
PMV BLD AUTO: 11.4 FL (ref 9–12.9)
POTASSIUM SERPL-SCNC: 4.4 MMOL/L (ref 3.6–5.5)
PROT SERPL-MCNC: 7 G/DL (ref 6–8.2)
RBC # BLD AUTO: 4.43 M/UL (ref 4.2–5.4)
SODIUM SERPL-SCNC: 141 MMOL/L (ref 135–145)
T3FREE SERPL-MCNC: 2.1 PG/ML (ref 2–4.4)
T4 FREE SERPL-MCNC: 1.19 NG/DL (ref 0.93–1.7)
TRIGL SERPL-MCNC: 72 MG/DL (ref 0–149)
TSH SERPL DL<=0.005 MIU/L-ACNC: 3.04 UIU/ML (ref 0.38–5.33)
WBC # BLD AUTO: 3.9 K/UL (ref 4.8–10.8)

## 2022-06-01 PROCEDURE — 84481 FREE ASSAY (FT-3): CPT

## 2022-06-01 PROCEDURE — 84439 ASSAY OF FREE THYROXINE: CPT

## 2022-06-01 PROCEDURE — 85025 COMPLETE CBC W/AUTO DIFF WBC: CPT

## 2022-06-01 PROCEDURE — 83036 HEMOGLOBIN GLYCOSYLATED A1C: CPT | Mod: GA

## 2022-06-01 PROCEDURE — 80061 LIPID PANEL: CPT

## 2022-06-01 PROCEDURE — 36415 COLL VENOUS BLD VENIPUNCTURE: CPT

## 2022-06-01 PROCEDURE — 84443 ASSAY THYROID STIM HORMONE: CPT

## 2022-06-01 PROCEDURE — 82306 VITAMIN D 25 HYDROXY: CPT

## 2022-06-01 PROCEDURE — 80053 COMPREHEN METABOLIC PANEL: CPT

## 2022-06-01 PROCEDURE — 86800 THYROGLOBULIN ANTIBODY: CPT

## 2022-06-03 LAB — THYROGLOB AB SERPL-ACNC: <0.9 IU/ML (ref 0–4)

## 2022-06-14 PROBLEM — M17.9 OSTEOARTHRITIS, KNEE: Status: ACTIVE | Noted: 2022-06-14

## 2022-10-13 PROBLEM — M17.12 ARTHRITIS OF LEFT KNEE: Status: ACTIVE | Noted: 2022-10-13

## 2023-03-16 ENCOUNTER — APPOINTMENT (OUTPATIENT)
Dept: RADIOLOGY | Facility: MEDICAL CENTER | Age: 68
DRG: 176 | End: 2023-03-16
Attending: EMERGENCY MEDICINE
Payer: MEDICARE

## 2023-03-16 ENCOUNTER — HOSPITAL ENCOUNTER (INPATIENT)
Facility: MEDICAL CENTER | Age: 68
LOS: 2 days | DRG: 176 | End: 2023-03-18
Attending: EMERGENCY MEDICINE | Admitting: STUDENT IN AN ORGANIZED HEALTH CARE EDUCATION/TRAINING PROGRAM
Payer: MEDICARE

## 2023-03-16 DIAGNOSIS — I82.401 ACUTE DEEP VEIN THROMBOSIS (DVT) OF RIGHT LOWER EXTREMITY, UNSPECIFIED VEIN (HCC): ICD-10-CM

## 2023-03-16 DIAGNOSIS — R79.89 ELEVATED TROPONIN: ICD-10-CM

## 2023-03-16 DIAGNOSIS — I26.99 PULMONARY EMBOLISM, BILATERAL (HCC): ICD-10-CM

## 2023-03-16 DIAGNOSIS — R55 NEAR SYNCOPE: ICD-10-CM

## 2023-03-16 DIAGNOSIS — I82.4Y1 ACUTE DEEP VEIN THROMBOSIS (DVT) OF PROXIMAL VEIN OF RIGHT LOWER EXTREMITY (HCC): ICD-10-CM

## 2023-03-16 LAB
ALBUMIN SERPL BCP-MCNC: 4.4 G/DL (ref 3.2–4.9)
ALBUMIN/GLOB SERPL: 1.3 G/DL
ALP SERPL-CCNC: 89 U/L (ref 30–99)
ALT SERPL-CCNC: 16 U/L (ref 2–50)
ANION GAP SERPL CALC-SCNC: 14 MMOL/L (ref 7–16)
APTT PPP: 26.1 SEC (ref 24.7–36)
AST SERPL-CCNC: 22 U/L (ref 12–45)
BASOPHILS # BLD AUTO: 0 % (ref 0–1.8)
BASOPHILS # BLD: 0 K/UL (ref 0–0.12)
BILIRUB SERPL-MCNC: 0.7 MG/DL (ref 0.1–1.5)
BUN SERPL-MCNC: 27 MG/DL (ref 8–22)
CALCIUM ALBUM COR SERPL-MCNC: 9.2 MG/DL (ref 8.5–10.5)
CALCIUM SERPL-MCNC: 9.5 MG/DL (ref 8.5–10.5)
CHLORIDE SERPL-SCNC: 104 MMOL/L (ref 96–112)
CO2 SERPL-SCNC: 21 MMOL/L (ref 20–33)
CREAT SERPL-MCNC: 0.71 MG/DL (ref 0.5–1.4)
EKG IMPRESSION: NORMAL
EOSINOPHIL # BLD AUTO: 0 K/UL (ref 0–0.51)
EOSINOPHIL NFR BLD: 0 % (ref 0–6.9)
ERYTHROCYTE [DISTWIDTH] IN BLOOD BY AUTOMATED COUNT: 41.7 FL (ref 35.9–50)
GFR SERPLBLD CREATININE-BSD FMLA CKD-EPI: 92 ML/MIN/1.73 M 2
GLOBULIN SER CALC-MCNC: 3.3 G/DL (ref 1.9–3.5)
GLUCOSE SERPL-MCNC: 107 MG/DL (ref 65–99)
HCT VFR BLD AUTO: 39.7 % (ref 37–47)
HGB BLD-MCNC: 13.4 G/DL (ref 12–16)
INR PPP: 1.19 (ref 0.87–1.13)
LYMPHOCYTES # BLD AUTO: 1.37 K/UL (ref 1–4.8)
LYMPHOCYTES NFR BLD: 19.8 % (ref 22–41)
MANUAL DIFF BLD: NORMAL
MCH RBC QN AUTO: 29.8 PG (ref 27–33)
MCHC RBC AUTO-ENTMCNC: 33.8 G/DL (ref 33.6–35)
MCV RBC AUTO: 88.2 FL (ref 81.4–97.8)
MONOCYTES # BLD AUTO: 0.39 K/UL (ref 0–0.85)
MONOCYTES NFR BLD AUTO: 5.6 % (ref 0–13.4)
MORPHOLOGY BLD-IMP: NORMAL
NEUTROPHILS # BLD AUTO: 5.15 K/UL (ref 2–7.15)
NEUTROPHILS NFR BLD: 74.6 % (ref 44–72)
NRBC # BLD AUTO: 0 K/UL
NRBC BLD-RTO: 0 /100 WBC
OVALOCYTES BLD QL SMEAR: NORMAL
PLATELET # BLD AUTO: 163 K/UL (ref 164–446)
PLATELET BLD QL SMEAR: NORMAL
PMV BLD AUTO: 10.7 FL (ref 9–12.9)
POIKILOCYTOSIS BLD QL SMEAR: NORMAL
POTASSIUM SERPL-SCNC: 4.4 MMOL/L (ref 3.6–5.5)
PROT SERPL-MCNC: 7.7 G/DL (ref 6–8.2)
PROTHROMBIN TIME: 14.9 SEC (ref 12–14.6)
RBC # BLD AUTO: 4.5 M/UL (ref 4.2–5.4)
RBC BLD AUTO: PRESENT
SODIUM SERPL-SCNC: 139 MMOL/L (ref 135–145)
TROPONIN T SERPL-MCNC: 19 NG/L (ref 6–19)
TROPONIN T SERPL-MCNC: 22 NG/L (ref 6–19)
UFH PPP CHRO-ACNC: 0.54 IU/ML
UFH PPP CHRO-ACNC: <0.1 IU/ML
WBC # BLD AUTO: 6.9 K/UL (ref 4.8–10.8)

## 2023-03-16 PROCEDURE — 85730 THROMBOPLASTIN TIME PARTIAL: CPT

## 2023-03-16 PROCEDURE — 85007 BL SMEAR W/DIFF WBC COUNT: CPT

## 2023-03-16 PROCEDURE — 96365 THER/PROPH/DIAG IV INF INIT: CPT

## 2023-03-16 PROCEDURE — 93005 ELECTROCARDIOGRAM TRACING: CPT | Performed by: EMERGENCY MEDICINE

## 2023-03-16 PROCEDURE — 96375 TX/PRO/DX INJ NEW DRUG ADDON: CPT

## 2023-03-16 PROCEDURE — 36415 COLL VENOUS BLD VENIPUNCTURE: CPT

## 2023-03-16 PROCEDURE — 71045 X-RAY EXAM CHEST 1 VIEW: CPT

## 2023-03-16 PROCEDURE — 99223 1ST HOSP IP/OBS HIGH 75: CPT | Mod: AI | Performed by: STUDENT IN AN ORGANIZED HEALTH CARE EDUCATION/TRAINING PROGRAM

## 2023-03-16 PROCEDURE — 71275 CT ANGIOGRAPHY CHEST: CPT

## 2023-03-16 PROCEDURE — 85610 PROTHROMBIN TIME: CPT

## 2023-03-16 PROCEDURE — 770020 HCHG ROOM/CARE - TELE (206)

## 2023-03-16 PROCEDURE — 99285 EMERGENCY DEPT VISIT HI MDM: CPT

## 2023-03-16 PROCEDURE — 93971 EXTREMITY STUDY: CPT | Mod: RT

## 2023-03-16 PROCEDURE — 700117 HCHG RX CONTRAST REV CODE 255: Performed by: EMERGENCY MEDICINE

## 2023-03-16 PROCEDURE — 85025 COMPLETE CBC W/AUTO DIFF WBC: CPT

## 2023-03-16 PROCEDURE — 84484 ASSAY OF TROPONIN QUANT: CPT

## 2023-03-16 PROCEDURE — 80053 COMPREHEN METABOLIC PANEL: CPT

## 2023-03-16 PROCEDURE — 85520 HEPARIN ASSAY: CPT | Mod: 91

## 2023-03-16 PROCEDURE — 700111 HCHG RX REV CODE 636 W/ 250 OVERRIDE (IP): Performed by: EMERGENCY MEDICINE

## 2023-03-16 PROCEDURE — 96366 THER/PROPH/DIAG IV INF ADDON: CPT

## 2023-03-16 RX ORDER — HEPARIN SODIUM 5000 [USP'U]/100ML
0-30 INJECTION, SOLUTION INTRAVENOUS CONTINUOUS
Status: DISCONTINUED | OUTPATIENT
Start: 2023-03-16 | End: 2023-03-17

## 2023-03-16 RX ORDER — AMOXICILLIN 250 MG
2 CAPSULE ORAL 2 TIMES DAILY
Status: DISCONTINUED | OUTPATIENT
Start: 2023-03-16 | End: 2023-03-18 | Stop reason: HOSPADM

## 2023-03-16 RX ORDER — HEPARIN SODIUM 1000 [USP'U]/ML
40 INJECTION, SOLUTION INTRAVENOUS; SUBCUTANEOUS PRN
Status: DISCONTINUED | OUTPATIENT
Start: 2023-03-16 | End: 2023-03-17

## 2023-03-16 RX ORDER — HEPARIN SODIUM 1000 [USP'U]/ML
80 INJECTION, SOLUTION INTRAVENOUS; SUBCUTANEOUS ONCE
Status: COMPLETED | OUTPATIENT
Start: 2023-03-16 | End: 2023-03-16

## 2023-03-16 RX ORDER — BISACODYL 10 MG
10 SUPPOSITORY, RECTAL RECTAL
Status: DISCONTINUED | OUTPATIENT
Start: 2023-03-16 | End: 2023-03-18 | Stop reason: HOSPADM

## 2023-03-16 RX ORDER — ACETAMINOPHEN 325 MG/1
650 TABLET ORAL EVERY 6 HOURS PRN
Status: DISCONTINUED | OUTPATIENT
Start: 2023-03-16 | End: 2023-03-18 | Stop reason: HOSPADM

## 2023-03-16 RX ORDER — OXYCODONE HYDROCHLORIDE 5 MG/1
5 TABLET ORAL
Status: DISCONTINUED | OUTPATIENT
Start: 2023-03-16 | End: 2023-03-18 | Stop reason: HOSPADM

## 2023-03-16 RX ORDER — MORPHINE SULFATE 4 MG/ML
2 INJECTION INTRAVENOUS
Status: DISCONTINUED | OUTPATIENT
Start: 2023-03-16 | End: 2023-03-18 | Stop reason: HOSPADM

## 2023-03-16 RX ORDER — OXYCODONE HYDROCHLORIDE 5 MG/1
2.5 TABLET ORAL
Status: DISCONTINUED | OUTPATIENT
Start: 2023-03-16 | End: 2023-03-18 | Stop reason: HOSPADM

## 2023-03-16 RX ORDER — MELOXICAM 7.5 MG/1
7.5 TABLET ORAL DAILY
Status: ON HOLD | COMMUNITY
End: 2023-03-18

## 2023-03-16 RX ORDER — POLYETHYLENE GLYCOL 3350 17 G/17G
1 POWDER, FOR SOLUTION ORAL
Status: DISCONTINUED | OUTPATIENT
Start: 2023-03-16 | End: 2023-03-18 | Stop reason: HOSPADM

## 2023-03-16 RX ADMIN — HEPARIN SODIUM 18 UNITS/KG/HR: 5000 INJECTION, SOLUTION INTRAVENOUS at 14:58

## 2023-03-16 RX ADMIN — IOHEXOL 60 ML: 350 INJECTION, SOLUTION INTRAVENOUS at 15:47

## 2023-03-16 RX ADMIN — HEPARIN SODIUM 5400 UNITS: 1000 INJECTION, SOLUTION INTRAVENOUS; SUBCUTANEOUS at 14:47

## 2023-03-16 ASSESSMENT — LIFESTYLE VARIABLES
CONSUMPTION TOTAL: NEGATIVE
EVER HAD A DRINK FIRST THING IN THE MORNING TO STEADY YOUR NERVES TO GET RID OF A HANGOVER: NO
SUBSTANCE_ABUSE: 0
TOTAL SCORE: 0
HAVE PEOPLE ANNOYED YOU BY CRITICIZING YOUR DRINKING: NO
ON A TYPICAL DAY WHEN YOU DRINK ALCOHOL HOW MANY DRINKS DO YOU HAVE: 1
HOW MANY TIMES IN THE PAST YEAR HAVE YOU HAD 5 OR MORE DRINKS IN A DAY: 0
EVER FELT BAD OR GUILTY ABOUT YOUR DRINKING: NO
DOES PATIENT WANT TO STOP DRINKING: NO
HAVE YOU EVER FELT YOU SHOULD CUT DOWN ON YOUR DRINKING: NO
TOTAL SCORE: 0
ALCOHOL_USE: YES
AVERAGE NUMBER OF DAYS PER WEEK YOU HAVE A DRINK CONTAINING ALCOHOL: 1
TOTAL SCORE: 0

## 2023-03-16 ASSESSMENT — ENCOUNTER SYMPTOMS
CHILLS: 0
BACK PAIN: 0
EYE PAIN: 0
SENSORY CHANGE: 0
BLURRED VISION: 0
FOCAL WEAKNESS: 0
INSOMNIA: 0
PALPITATIONS: 0
FALLS: 0
DIZZINESS: 0
LOSS OF CONSCIOUSNESS: 0
HEADACHES: 0
FEVER: 0
VOMITING: 0
WHEEZING: 0
COUGH: 1
NAUSEA: 0
SPUTUM PRODUCTION: 0
ABDOMINAL PAIN: 0
SHORTNESS OF BREATH: 1
MYALGIAS: 1
HEMOPTYSIS: 0

## 2023-03-16 ASSESSMENT — COGNITIVE AND FUNCTIONAL STATUS - GENERAL
DAILY ACTIVITIY SCORE: 24
MOBILITY SCORE: 23
CLIMB 3 TO 5 STEPS WITH RAILING: A LITTLE
SUGGESTED CMS G CODE MODIFIER MOBILITY: CI
SUGGESTED CMS G CODE MODIFIER DAILY ACTIVITY: CH

## 2023-03-16 ASSESSMENT — PATIENT HEALTH QUESTIONNAIRE - PHQ9
2. FEELING DOWN, DEPRESSED, IRRITABLE, OR HOPELESS: NOT AT ALL
SUM OF ALL RESPONSES TO PHQ9 QUESTIONS 1 AND 2: 0
1. LITTLE INTEREST OR PLEASURE IN DOING THINGS: NOT AT ALL

## 2023-03-16 ASSESSMENT — FIBROSIS 4 INDEX
FIB4 SCORE: 2.33
FIB4 SCORE: 1.26

## 2023-03-16 NOTE — ED NOTES
Pt medicated per MAR. Ambulates to bathroom and back with steady gait. No other needs at this time. Call light within reach.

## 2023-03-16 NOTE — ED NOTES
reports event yesterday am with diaphoresis, SOB, lightheaded, pale, nausea, weak and thready radial pulse.

## 2023-03-16 NOTE — ED NOTES
[place] with significant other bedside. No needs at this time. Call light within reach.

## 2023-03-16 NOTE — ED TRIAGE NOTES
Pt comes in complaining of R leg pain swelling. Pt with hx of DVTs however currently not taking any blood thinners.

## 2023-03-16 NOTE — ED PROVIDER NOTES
"  ER Provider Note    Scribed for Andrei Pierre M.d. by Lara Trinidad. 3/16/2023  11:16 AM    Primary Care Provider: Saturnino Walton M.D.    CHIEF COMPLAINT  Chief Complaint   Patient presents with    Leg Pain           Leg Swelling            EXTERNAL RECORDS REVIEWED  Outpatient labs & studies Patient has a history of DVT    HPI/ROS  LIMITATION TO HISTORY   Select: : None  OUTSIDE HISTORIAN(S):  Significant other -     Zora Mcneil is a 67 y.o. female who presents to the ED complaining of right leg pain. Patient locates her pain to the back of calf and describes the pain as soreness. She has been doing PT and pilate's, but denies known injuries. She has a history of surgery to her left leg 10 months ago. She has a history of DVTs but is not currently on blood thinners.     Patients  describes the patient had an episode of shortness of breath, diaphoresis, and lightheadedness. She adds she had the urge to have a bowel during this episode as well. Her  checked her pulse and it felt abnormal to him as if she was skipping a beat. Patient denies chest pain during that episode and states it only lasted a few minutes. Denies history of a fib.    PAST MEDICAL HISTORY  Past Medical History:   Diagnosis Date    Arthritis     fingers    Blood clotting disorder (HCC) 2014    xarelto x 1 year, left leg    Bowel habit changes     Bronchitis     Cold 04/18/2018    \"Minor cold one week ago\" Denies productive cough, SOB    Dental disorder     upper frontal bridge    Hyperlipidemia     Obesity     Pain 04/2018    left knee, back    Psychiatric problem     depression    Unspecified urinary incontinence     stress       SURGICAL HISTORY  Past Surgical History:   Procedure Laterality Date    PB TOTAL KNEE ARTHROPLASTY Left 10/13/2022    Procedure: LEFT TOTAL KNEE ARTHROPLASTY;  Surgeon: Rick Portillo M.D.;  Location: [facility];  Service: Orthopedics    KNEE ARTHROSCOPY Left " 4/23/2018    Procedure: KNEE ARTHROSCOPY;  Surgeon: Tom Moore M.D.;  Location: SURGERY [facility];  Service: Orthopedics    MENISCECTOMY, KNEE, MEDIAL Left 4/23/2018    Procedure: MEDIAL MENISCECTOMY - PARTIAL;  Surgeon: Tom Moore M.D.;  Location: SURGERY [facility];  Service: Orthopedics    CARPAL TUNNEL ENDOSCOPIC Right 5/3/2017    Procedure: CARPAL TUNNEL ENDOSCOPIC;  Surgeon: Iván Edge M.D.;  Location: SURGERY [facility];  Service:     LESION EXCISION ORTHO  1/27/2014    Performed by Fermín Edge M.D. at SURGERY SAME DAY [facility]    BONE SPUR EXCISION  11/11/2011    Performed by REGINE PERERA at SURGERY SAME DAY [facility]    TOE ARTHROPLASTY  11/11/2011    Performed by REGINE PERERA at SURGERY SAME DAY [facility]    COLONOSCOPY  2009,recheck 10    HEMORRHOIDECTOMY      KNEE ARTHROSCOPY      OTHER ORTHOPEDIC SURGERY      toe joint surgery       FAMILY HISTORY  Family History   Problem Relation Age of Onset    Cancer Father         lung    Arthritis Mother         rheumatoid arthritis       SOCIAL HISTORY   reports that she quit smoking about 43 years ago. Her smoking use included cigarettes. She has a 25.00 pack-year smoking history. She has never used smokeless tobacco. She reports current alcohol use. She reports current drug use.    CURRENT MEDICATIONS  Previous Medications    MELOXICAM (MOBIC) 7.5 MG TAB    TAKE 1 TABLET BY MOUTH EVERY DAY       ALLERGIES  Patient has no known allergies.    PHYSICAL EXAM  BP (!) 167/92   Pulse 89   Temp 36.7 °C (98 °F) (Temporal)   Resp 18   Wt 80.1 kg (176 lb 9.4 oz)   SpO2 98%   BMI 28.50 kg/m²   Nursing note and vitals reviewed.  Constitutional: Well-developed and well-nourished. No distress.   HENT: Head is normocephalic and atraumatic. Oropharynx is clear and moist without exudate or erythema.   Eyes: Pupils are equal, round, and reactive to light. Conjunctiva are normal.   Cardiovascular:  Normal rate and regular rhythm. No murmur heard. Normal radial pulses.  Pulmonary/Chest: Breath sounds normal. No wheezes or rales.   Abdominal: Soft and non-tender. No distention    Musculoskeletal: Extremities exhibit normal range of motion without edema.  Patient does have tenderness over the right calf.  No palpable cords noted.  No erythema.  Neurological: Awake, alert and oriented to person, place, and time. No focal deficits noted.  Skin: Skin is warm and dry. No rash.   Psychiatric: Normal mood and affect. Appropriate for clinical situation    DIAGNOSTIC STUDIES    Labs:   Results for orders placed or performed during the hospital encounter of 03/16/23   CBC with Differential   Result Value Ref Range    WBC 6.9 4.8 - 10.8 K/uL    RBC 4.50 4.20 - 5.40 M/uL    Hemoglobin 13.4 12.0 - 16.0 g/dL    Hematocrit 39.7 37.0 - 47.0 %    MCV 88.2 81.4 - 97.8 fL    MCH 29.8 27.0 - 33.0 pg    MCHC 33.8 33.6 - 35.0 g/dL    RDW 41.7 35.9 - 50.0 fL    Platelet Count 163 (L) 164 - 446 K/uL    MPV 10.7 9.0 - 12.9 fL    Neutrophils-Polys 74.60 (H) 44.00 - 72.00 %    Lymphocytes 19.80 (L) 22.00 - 41.00 %    Monocytes 5.60 0.00 - 13.40 %    Eosinophils 0.00 0.00 - 6.90 %    Basophils 0.00 0.00 - 1.80 %    Nucleated RBC 0.00 /100 WBC    Neutrophils (Absolute) 5.15 2.00 - 7.15 K/uL    Lymphs (Absolute) 1.37 1.00 - 4.80 K/uL    Monos (Absolute) 0.39 0.00 - 0.85 K/uL    Eos (Absolute) 0.00 0.00 - 0.51 K/uL    Baso (Absolute) 0.00 0.00 - 0.12 K/uL    NRBC (Absolute) 0.00 K/uL   Complete Metabolic Panel (CMP)   Result Value Ref Range    Sodium 139 135 - 145 mmol/L    Potassium 4.4 3.6 - 5.5 mmol/L    Chloride 104 96 - 112 mmol/L    Co2 21 20 - 33 mmol/L    Anion Gap 14.0 7.0 - 16.0    Glucose 107 (H) 65 - 99 mg/dL    Bun 27 (H) 8 - 22 mg/dL    Creatinine 0.71 0.50 - 1.40 mg/dL    Calcium 9.5 8.5 - 10.5 mg/dL    AST(SGOT) 22 12 - 45 U/L    ALT(SGPT) 16 2 - 50 U/L    Alkaline Phosphatase 89 30 - 99 U/L    Total Bilirubin 0.7 0.1 - 1.5  mg/dL    Albumin 4.4 3.2 - 4.9 g/dL    Total Protein 7.7 6.0 - 8.2 g/dL    Globulin 3.3 1.9 - 3.5 g/dL    A-G Ratio 1.3 g/dL   Troponins NOW   Result Value Ref Range    Troponin T 22 (H) 6 - 19 ng/L   PLATELET ESTIMATE   Result Value Ref Range    Plt Estimation Normal    MORPHOLOGY   Result Value Ref Range    RBC Morphology Present     Poikilocytosis 1+     Ovalocytes 1+    PERIPHERAL SMEAR REVIEW   Result Value Ref Range    Peripheral Smear Review see below    DIFFERENTIAL MANUAL   Result Value Ref Range    Manual Diff Status PERFORMED    CORRECTED CALCIUM   Result Value Ref Range    Correct Calcium 9.2 8.5 - 10.5 mg/dL   ESTIMATED GFR   Result Value Ref Range    GFR (CKD-EPI) 92 >60 mL/min/1.73 m 2   EKG (NOW)   Result Value Ref Range    Report       [facility] Emergency Dept.    Test Date:  2023  Pt Name:    NILSON PEREZ              Department: ER  MRN:        [id number]                      Room:  Gender:     Female                       Technician: [facility]  :        1955                   Requested By:ER TRIAGE PROTOCOL  Order #:    [id number]                    Reading MD:    Measurements  Intervals                                Axis  Rate:       74                           P:          49  AR:         158                          QRS:        39  QRSD:       93                           T:          32  QT:         415  QTc:        461    Interpretive Statements  Sinus rhythm  Compared to ECG 2018 14:30:10  No significant changes         EKG:   I have independently interpreted this EKG as above     Radiology:   The attending emergency physician has independently interpreted the diagnostic imaging associated with this visit and am waiting the final reading from the radiologist.   Preliminary interpretation is a follows: DVT noted  Radiologist interpretation:   US-EXTREMITY VENOUS LOWER UNILAT RIGHT    FINDINGS:    Right lower extremity.    Acute to subacute, occlusive  deep venous thrombus in the distal popliteal,    proximal gastrocnemius, proximal peroneal, and proximal posterior tibial    veins.    There is thrombus extending to the proximal popliteal that is loosely    attached.    The distal posterior tibial and peroneal veins are partially thrombosed.    All other veins demonstrate complete color filling and compressibility with    normal venous flow dynamics including spontaneous flow and respiratory    phasicity.      Flow was evaluated in the contralateral common femoral vein and normal    venous flow dynamics including spontaneous flow and respiratory phasic    variation were demonstrated.       DX-CHEST-PORTABLE (1 VIEW)   Final Result      1.  No acute cardiac or pulmonary abnormalities are identified.          COURSE & MEDICAL DECISION MAKING     ED Observation Status? No     Observation plan is as follows: laboratory studies and imaging    INITIAL ASSESSMENT, COURSE AND PLAN  11:18 AM - Patient seen and examined at bedside. Discussed plan of care, including labs and imaging. Patient agrees to the plan of care. Ordered for US extremity venous lower, chest x-ray, troponin CBC w/ diff, CMP, and EKG to evaluate her symptoms.     12:34 PM - Reviewed the patient's lab and imaging results. US shows DVT. Troponin is elevated. Ordered CTA chest pulmonary artery.    12:44 PM - Spoke with pharmacy who recommended treatment with heaprin. Updated the patient on findings and informed her of the need for CT.     I have initiated treatment with heparin.  I am concerned that the patient has somewhat loosely adherent thrombus noted on ultrasound.  Patient's episode yesterday is concerning for possible pulmonary embolism.  As of 1446 the patient CT scan of the chest to evaluate for PE remains pending.  My partner Dr. Pang will follow-up with the results of the CT scan.  I feel the patient likely requires to be admitted for further evaluation and treatment however final disposition is  somewhat dependent on the results of the CT scan.      DIANGOSIS  1. Acute deep vein thrombosis (DVT) of proximal vein of right lower extremity (HCC)    2. Elevated troponin    3. Near syncope          Lara GLASGOW (Paulinoibphong), am scribing for, and in the presence of, Andrei Pierre M.D..    Electronically signed by: Lara Trinidad (Inderjit), 3/16/2023    IAndrei M.D. personally performed the services described in this documentation, as scribed by Lara Trinidad in my presence, and it is both accurate and complete.  The note accurately reflects work and decisions made by me.  Andrei Pierre M.D.  3/16/2023  2:47 PM

## 2023-03-17 ENCOUNTER — APPOINTMENT (OUTPATIENT)
Dept: CARDIOLOGY | Facility: MEDICAL CENTER | Age: 68
DRG: 176 | End: 2023-03-17
Attending: STUDENT IN AN ORGANIZED HEALTH CARE EDUCATION/TRAINING PROGRAM
Payer: MEDICARE

## 2023-03-17 LAB
ANION GAP SERPL CALC-SCNC: 13 MMOL/L (ref 7–16)
BUN SERPL-MCNC: 18 MG/DL (ref 8–22)
CALCIUM SERPL-MCNC: 9 MG/DL (ref 8.5–10.5)
CHLORIDE SERPL-SCNC: 105 MMOL/L (ref 96–112)
CO2 SERPL-SCNC: 19 MMOL/L (ref 20–33)
CREAT SERPL-MCNC: 0.61 MG/DL (ref 0.5–1.4)
ERYTHROCYTE [DISTWIDTH] IN BLOOD BY AUTOMATED COUNT: 42.1 FL (ref 35.9–50)
GFR SERPLBLD CREATININE-BSD FMLA CKD-EPI: 96 ML/MIN/1.73 M 2
GLUCOSE SERPL-MCNC: 109 MG/DL (ref 65–99)
HCT VFR BLD AUTO: 37.6 % (ref 37–47)
HGB BLD-MCNC: 12.3 G/DL (ref 12–16)
LV EJECT FRACT  99904: 55
MCH RBC QN AUTO: 29.7 PG (ref 27–33)
MCHC RBC AUTO-ENTMCNC: 32.7 G/DL (ref 33.6–35)
MCV RBC AUTO: 90.8 FL (ref 81.4–97.8)
PLATELET # BLD AUTO: 158 K/UL (ref 164–446)
PMV BLD AUTO: 10.7 FL (ref 9–12.9)
POTASSIUM SERPL-SCNC: 3.9 MMOL/L (ref 3.6–5.5)
RBC # BLD AUTO: 4.14 M/UL (ref 4.2–5.4)
SODIUM SERPL-SCNC: 137 MMOL/L (ref 135–145)
UFH PPP CHRO-ACNC: 0.4 IU/ML
WBC # BLD AUTO: 7.2 K/UL (ref 4.8–10.8)

## 2023-03-17 PROCEDURE — A9270 NON-COVERED ITEM OR SERVICE: HCPCS | Performed by: HOSPITALIST

## 2023-03-17 PROCEDURE — 700102 HCHG RX REV CODE 250 W/ 637 OVERRIDE(OP): Performed by: STUDENT IN AN ORGANIZED HEALTH CARE EDUCATION/TRAINING PROGRAM

## 2023-03-17 PROCEDURE — 80048 BASIC METABOLIC PNL TOTAL CA: CPT

## 2023-03-17 PROCEDURE — 36415 COLL VENOUS BLD VENIPUNCTURE: CPT

## 2023-03-17 PROCEDURE — 93306 TTE W/DOPPLER COMPLETE: CPT

## 2023-03-17 PROCEDURE — A9270 NON-COVERED ITEM OR SERVICE: HCPCS | Performed by: STUDENT IN AN ORGANIZED HEALTH CARE EDUCATION/TRAINING PROGRAM

## 2023-03-17 PROCEDURE — 93306 TTE W/DOPPLER COMPLETE: CPT | Mod: 26 | Performed by: INTERNAL MEDICINE

## 2023-03-17 PROCEDURE — 770020 HCHG ROOM/CARE - TELE (206)

## 2023-03-17 PROCEDURE — 700111 HCHG RX REV CODE 636 W/ 250 OVERRIDE (IP): Performed by: EMERGENCY MEDICINE

## 2023-03-17 PROCEDURE — 85027 COMPLETE CBC AUTOMATED: CPT

## 2023-03-17 PROCEDURE — 85520 HEPARIN ASSAY: CPT

## 2023-03-17 PROCEDURE — 700102 HCHG RX REV CODE 250 W/ 637 OVERRIDE(OP): Performed by: HOSPITALIST

## 2023-03-17 PROCEDURE — 99232 SBSQ HOSP IP/OBS MODERATE 35: CPT | Performed by: HOSPITALIST

## 2023-03-17 RX ADMIN — DOCUSATE SODIUM 50 MG AND SENNOSIDES 8.6 MG 2 TABLET: 8.6; 5 TABLET, FILM COATED ORAL at 04:48

## 2023-03-17 RX ADMIN — APIXABAN 10 MG: 5 TABLET, FILM COATED ORAL at 18:01

## 2023-03-17 RX ADMIN — HEPARIN SODIUM 18 UNITS/KG/HR: 5000 INJECTION, SOLUTION INTRAVENOUS at 10:34

## 2023-03-17 ASSESSMENT — ENCOUNTER SYMPTOMS
MYALGIAS: 0
HEADACHES: 0
BACK PAIN: 0
WHEEZING: 0
HEARTBURN: 0
FEVER: 0
DIZZINESS: 0
COUGH: 0
HEMOPTYSIS: 0
PND: 0
NAUSEA: 0
PALPITATIONS: 0
CHILLS: 0
DEPRESSION: 0
SINUS PAIN: 0
BRUISES/BLEEDS EASILY: 0
DOUBLE VISION: 0
VOMITING: 0
BLURRED VISION: 0
CLAUDICATION: 0

## 2023-03-17 ASSESSMENT — PAIN DESCRIPTION - PAIN TYPE: TYPE: ACUTE PAIN

## 2023-03-17 ASSESSMENT — LIFESTYLE VARIABLES: SUBSTANCE_ABUSE: 0

## 2023-03-17 ASSESSMENT — FIBROSIS 4 INDEX: FIB4 SCORE: 2.4

## 2023-03-17 NOTE — PROGRESS NOTES
"ED Observation Progress Note    Date of Service: 03/16/23    Interval History and Interventions  Care was turned over to me from Dr. Pierre for CT read and final disposition of the patient.  Patient had evidence of DVT on ultrasound and was started on heparin.  Patient had an episode of chest pain yesterday and therefore a CTA of the chest was ordered to evaluate for possible PE.  Patient has been mildly tachypneic.  Blood pressure, normal pulse and normal O2 saturation.  She has no current chest pain.  CTA showed evidence of bilateral PEs with mild RV strain.  I spoke with Dr. Gonda the intensivist about intervention and placement of the patient in the hospital.  He states with the patient's normal vital signs and no oxygen requirement that she is stable for the telemetry floor and that no further interventions are necessary.  Patient agrees to admission.  Discussed the case with Dr. Mancuso the hospitalist who will see the patient.    Physical Exam  /80   Pulse 76   Temp 36.7 °C (98 °F) (Temporal)   Resp (!) 22   Ht 1.676 m (5' 6\")   Wt 80.1 kg (176 lb 9.4 oz)   SpO2 95%   BMI 28.50 kg/m² .    Constitutional: Awake and alert. Nontoxic  HENT:  Grossly normal  Eyes: Grossly normal  Neck: Normal range of motion  Cardiovascular: Normal heart rate   Thorax & Lungs: mild tachypnea  Abdomen: Nontender  Skin:  No pathologic rash.   Extremities: Well perfused  Psychiatric: Affect normal    Labs  Results for orders placed or performed during the hospital encounter of 03/16/23   CBC with Differential   Result Value Ref Range    WBC 6.9 4.8 - 10.8 K/uL    RBC 4.50 4.20 - 5.40 M/uL    Hemoglobin 13.4 12.0 - 16.0 g/dL    Hematocrit 39.7 37.0 - 47.0 %    MCV 88.2 81.4 - 97.8 fL    MCH 29.8 27.0 - 33.0 pg    MCHC 33.8 33.6 - 35.0 g/dL    RDW 41.7 35.9 - 50.0 fL    Platelet Count 163 (L) 164 - 446 K/uL    MPV 10.7 9.0 - 12.9 fL    Neutrophils-Polys 74.60 (H) 44.00 - 72.00 %    Lymphocytes 19.80 (L) 22.00 - 41.00 %    " Monocytes 5.60 0.00 - 13.40 %    Eosinophils 0.00 0.00 - 6.90 %    Basophils 0.00 0.00 - 1.80 %    Nucleated RBC 0.00 /100 WBC    Neutrophils (Absolute) 5.15 2.00 - 7.15 K/uL    Lymphs (Absolute) 1.37 1.00 - 4.80 K/uL    Monos (Absolute) 0.39 0.00 - 0.85 K/uL    Eos (Absolute) 0.00 0.00 - 0.51 K/uL    Baso (Absolute) 0.00 0.00 - 0.12 K/uL    NRBC (Absolute) 0.00 K/uL   Complete Metabolic Panel (CMP)   Result Value Ref Range    Sodium 139 135 - 145 mmol/L    Potassium 4.4 3.6 - 5.5 mmol/L    Chloride 104 96 - 112 mmol/L    Co2 21 20 - 33 mmol/L    Anion Gap 14.0 7.0 - 16.0    Glucose 107 (H) 65 - 99 mg/dL    Bun 27 (H) 8 - 22 mg/dL    Creatinine 0.71 0.50 - 1.40 mg/dL    Calcium 9.5 8.5 - 10.5 mg/dL    AST(SGOT) 22 12 - 45 U/L    ALT(SGPT) 16 2 - 50 U/L    Alkaline Phosphatase 89 30 - 99 U/L    Total Bilirubin 0.7 0.1 - 1.5 mg/dL    Albumin 4.4 3.2 - 4.9 g/dL    Total Protein 7.7 6.0 - 8.2 g/dL    Globulin 3.3 1.9 - 3.5 g/dL    A-G Ratio 1.3 g/dL   Troponins NOW   Result Value Ref Range    Troponin T 22 (H) 6 - 19 ng/L   Troponins in two (2) hours   Result Value Ref Range    Troponin T 19 6 - 19 ng/L   PLATELET ESTIMATE   Result Value Ref Range    Plt Estimation Normal    MORPHOLOGY   Result Value Ref Range    RBC Morphology Present     Poikilocytosis 1+     Ovalocytes 1+    PERIPHERAL SMEAR REVIEW   Result Value Ref Range    Peripheral Smear Review see below    DIFFERENTIAL MANUAL   Result Value Ref Range    Manual Diff Status PERFORMED    CORRECTED CALCIUM   Result Value Ref Range    Correct Calcium 9.2 8.5 - 10.5 mg/dL   ESTIMATED GFR   Result Value Ref Range    GFR (CKD-EPI) 92 >60 mL/min/1.73 m 2   aPTT   Result Value Ref Range    APTT 26.1 24.7 - 36.0 sec   Prothrombin Time   Result Value Ref Range    PT 14.9 (H) 12.0 - 14.6 sec    INR 1.19 (H) 0.87 - 1.13   Heparin Anti-Xa   Result Value Ref Range    Heparin Xa (UFH) <0.10 IU/mL   EKG (NOW)   Result Value Ref Range    Report       [facility]  [place] [facility] Dept.    Test Date:  2023  Pt Name:    NILSON PEREZ              Department: ER  MRN:        [id number]                      Room:       BL 19  Gender:     Female                       Technician: EDSCHUCKY  :        1955                   Requested By:ER TRIAGE PROTOCOL  Order #:    [id number]                    Reading MD: ASHKAN KENDALL MD    Measurements  Intervals                                Axis  Rate:       74                           P:          49  PA:         158                          QRS:        39  QRSD:       93                           T:          32  QT:         415  QTc:        461    Interpretive Statements  Sinus rhythm  Compared to ECG 2018 14:30:10  No significant changes  Electronically Signed On 3- 14:44:18 PDT by ASHKAN KENDALL MD         Radiology  CT-CTA CHEST PULMONARY ARTERY W/ RECONS   Final Result      1.  Acute pulmonary emboli in the lobar, segmental and subsegmental pulmonary arterial branches bilaterally. Associated right heart strain.   2.  Cardiomegaly.   3.  A 1.8 cm right thyroid nodule can be followed with outpatient ultrasound.      US-EXTREMITY VENOUS LOWER UNILAT RIGHT   Final Result      DX-CHEST-PORTABLE (1 VIEW)   Final Result      1.  No acute cardiac or pulmonary abnormalities are identified.      EC-ECHOCARDIOGRAM COMPLETE W/O CONT    (Results Pending)       Problem List  1. Pulmonary emboli with right heart strain  1. Acute deep vein thrombosis (DVT) of proximal vein of right lower extremity (HCC)        2. Elevated troponin        3. Near syncope             Electronically signed by: Nidhi Pang M.D., 3/16/2023 5:11 PM

## 2023-03-17 NOTE — PROGRESS NOTES
Pt arrived to unit via gurney at 1820. Pt oriented to room, unit, and plan of care. Tele-monitor placed and monitor room notified. All questions answered at this time. Call light within reach; fall precautions in place.

## 2023-03-17 NOTE — ED NOTES
Pt and significant other provided ice water, per request. No other needs at this time. Call light within reach.

## 2023-03-17 NOTE — HOSPITAL COURSE
Zora Mcneil is a 67 y.o. female who presented 3/16/2023 with leg pain. Patient with history of DVT's, who presents with right leg pain with associated dyspnea. Patient reports yesterday after pilates she noticed some behind right calf pain/soreness. She relayed it to a difficult workout. Pain worsened, and patient also had associated dyspnea and diaphoresis. She took an aspirin and presented for evaluation. Her prior DVT in left leg was provoked after a vein procedure, she completed 1 year AC for that. She then had a DVT in right leg two years ago for unclear reasons and was taken off of AC after 3 months. Patient denies any recent trauma, falls, surgery, immobility, illness.  In the ED, vital signs stable on room air. CBC and CMP unremarkable. Troponin 22 with repeat to 19. US LE shows acute R LE DVT. CTA done showing bilateral pulmonary emboli with signs of right heart strain. Patient started on heparin drip. She will be admitted for DVT/PE treatment and evaluation.

## 2023-03-17 NOTE — ASSESSMENT & PLAN NOTE
Presents for right leg pain and dyspnea  US shows acute R LE DVT, CTA shows acute bilateral pulmonary emboli  On room air, vitals stable  Admit to telemetry  Continue heparin drip  Echo ordered  Given 3rd DVT, this time unprovoked, recommend indefinite anticoagulation; she has tolerated xarelto before,  not sure if this is covered by their insurance now  Pending echo results, consider pulmonology consult for EKOS  Continue heparin, f/u echo if stable will transition to po eliquis.

## 2023-03-17 NOTE — PROGRESS NOTES
4 Eyes Skin Assessment Completed by Lois RN and Alfredo RN.    Head WDL  Ears WDL  Nose WDL  Mouth WDL  Neck WDL  Breast/Chest WDL  Shoulder Blades WDL  Spine WDL  (R) Arm/Elbow/Hand WDL  (L) Arm/Elbow/Hand WDL  Abdomen WDL  Groin WDL  Scrotum/Coccyx/Buttocks WDL  (R) Leg WDL  (L) Leg WDL  (R) Heel/Foot/Toe WDL  (L) Heel/Foot/Toe WDL          Devices In Places Tele Box      Interventions In Place Pressure Redistribution Mattress    Possible Skin Injury No    Pictures Uploaded Into Epic N/A  Wound Consult Placed N/A  RN Wound Prevention Protocol Ordered No

## 2023-03-17 NOTE — PROGRESS NOTES
Pulmonary/Critical Care Medicine   Progress Note    Date of service: 3/16/2023  Time: 1630    I was called by Dr. Pang to discuss patient's management and need for potential ICU versus IMCU admission.  She is a 67-year-old female presenting to the emergency department with right leg pain with a history of prior DVTs.  She had associated shortness of breath, diaphoresis, and lightheadedness.  In the ED, patient underwent work-up showing an acute DVT of the proximal right lower extremity vein and a CT PE study showing bilateral pulmonary emboli with evidence of RV strain.  Patient has been hemodynamically stable and oxygenating well on room air.  Her troponin was mildly elevated at 22 and the repeat 1 came down to 19.  She was started on anticoagulation by the emergency physician. She has a PESI score of 2 and no contraindications for systemic anticoagulation.  At this time, she does not require EKOS catheter treatment nor systemic tPA given her low mortality risk.  I recommend continuing full dose anticoagulation and admission to telemetry and an echocardiogram for further evaluation.  Should patient deteriorate, would have a low threshold to reconsult critical care for systemic tPA versus EKOS.

## 2023-03-17 NOTE — PROGRESS NOTES
[facility] Medicine Daily Progress Note    Date of Service  3/17/2023    Chief Complaint  Zora Mcneil is a 67 y.o. female admitted 3/16/2023 with PE/DVT    Hospital Course  Zora Mcneil is a 67 y.o. female who presented 3/16/2023 with leg pain. Patient with history of DVT's, who presents with right leg pain with associated dyspnea. Patient reports yesterday after pilates she noticed some behind right calf pain/soreness. She relayed it to a difficult workout. Pain worsened, and patient also had associated dyspnea and diaphoresis. She took an aspirin and presented for evaluation. Her prior DVT in left leg was provoked after a vein procedure, she completed 1 year AC for that. She then had a DVT in right leg two years ago for unclear reasons and was taken off of AC after 3 months. Patient denies any recent trauma, falls, surgery, immobility, illness.  In the ED, vital signs stable on room air. CBC and CMP unremarkable. Troponin 22 with repeat to 19. US LE shows acute R LE DVT. CTA done showing bilateral pulmonary emboli with signs of right heart strain. Patient started on heparin drip. She will be admitted for DVT/PE treatment and evaluation.    Interval Problem Update  3/17 in bed, no fever or chills, no nausea or vomiting, leg pain stable, on RA, had mild orthopnea, no chest pain, no hemoptysis no wheezing. Continue heparin drip, monitoring response xa, watching for bleeding, f/u echo results.     I have discussed this patient's plan of care and discharge plan at IDT rounds today with Case Management, Nursing, Nursing leadership, and other members of the IDT team.    Consultants/Specialty  na    Code Status  Full Code    Disposition  Patient is not medically cleared for discharge.   Anticipate discharge to to home with close outpatient follow-up.  I have placed the appropriate orders for post-discharge needs.    Review of Systems  Review of Systems   Constitutional:  Negative for chills and  fever.   HENT:  Negative for congestion, nosebleeds and sinus pain.    Eyes:  Negative for blurred vision and double vision.   Respiratory:  Negative for cough, hemoptysis and wheezing.    Cardiovascular:  Negative for chest pain, palpitations, claudication, leg swelling and PND.   Gastrointestinal:  Negative for heartburn, nausea and vomiting.   Genitourinary:  Negative for hematuria and urgency.   Musculoskeletal:  Negative for back pain and myalgias.        Right leg pain.    Skin:  Negative for rash.   Neurological:  Negative for dizziness and headaches.   Endo/Heme/Allergies:  Does not bruise/bleed easily.   Psychiatric/Behavioral:  Negative for depression, substance abuse and suicidal ideas.       Physical Exam  Temp:  [36.5 °C (97.7 °F)-37.9 °C (100.2 °F)] 36.8 °C (98.2 °F)  Pulse:  [73-82] 82  Resp:  [13-22] 16  BP: (121-166)/(67-85) 146/84  SpO2:  [90 %-96 %] 94 %    Physical Exam  Vitals and nursing note reviewed.   Constitutional:       General: She is not in acute distress.     Appearance: Normal appearance. She is not ill-appearing.   HENT:      Head: Normocephalic.      Nose: Nose normal.      Mouth/Throat:      Pharynx: Oropharynx is clear. No oropharyngeal exudate or posterior oropharyngeal erythema.   Eyes:      General: No scleral icterus.        Right eye: No discharge.         Left eye: No discharge.      Extraocular Movements: Extraocular movements intact.      Conjunctiva/sclera: Conjunctivae normal.   Cardiovascular:      Rate and Rhythm: Normal rate and regular rhythm.      Pulses: Normal pulses.      Heart sounds: Normal heart sounds.   Pulmonary:      Effort: Pulmonary effort is normal. No respiratory distress.      Breath sounds: Normal breath sounds. No wheezing.   Abdominal:      General: Bowel sounds are normal. There is no distension.      Palpations: Abdomen is soft.      Tenderness: There is no abdominal tenderness. There is no guarding.   Musculoskeletal:         General: Normal  range of motion.      Cervical back: Normal range of motion and neck supple. No rigidity or tenderness.      Right lower leg: Edema (trace) present.      Left lower leg: No edema.   Skin:     General: Skin is warm and dry.      Capillary Refill: Capillary refill takes less than 2 seconds.      Coloration: Skin is not jaundiced.      Findings: No bruising.   Neurological:      General: No focal deficit present.      Mental Status: She is alert and oriented to person, place, and time.      Cranial Nerves: No cranial nerve deficit.      Motor: No weakness.   Psychiatric:         Mood and Affect: Mood normal.         Behavior: Behavior normal.       Fluids    Intake/Output Summary (Last 24 hours) at 3/17/2023 1457  Last data filed at 3/17/2023 0000  Gross per 24 hour   Intake 200.98 ml   Output --   Net 200.98 ml       Laboratory  Recent Labs     03/16/23  1115 03/17/23  0447   WBC 6.9 7.2   RBC 4.50 4.14*   HEMOGLOBIN 13.4 12.3   HEMATOCRIT 39.7 37.6   MCV 88.2 90.8   MCH 29.8 29.7   MCHC 33.8 32.7*   RDW 41.7 42.1   PLATELETCT 163* 158*   MPV 10.7 10.7     Recent Labs     03/16/23  1115 03/17/23  0447   SODIUM 139 137   POTASSIUM 4.4 3.9   CHLORIDE 104 105   CO2 21 19*   GLUCOSE 107* 109*   BUN 27* 18   CREATININE 0.71 0.61   CALCIUM 9.5 9.0     Recent Labs     03/16/23  1115   APTT 26.1   INR 1.19*               Imaging  CT-CTA CHEST PULMONARY ARTERY W/ RECONS   Final Result   Addendum (preliminary) 1 of 1   Findings were discussed with Nidhi Pang on 3/16/2023 4:15 PM.      Final      1.  Acute pulmonary emboli in the lobar, segmental and subsegmental pulmonary arterial branches bilaterally. Associated right heart strain.   2.  Cardiomegaly.   3.  A 1.8 cm right thyroid nodule can be followed with outpatient ultrasound.      US-EXTREMITY VENOUS LOWER UNILAT RIGHT   Final Result      DX-CHEST-PORTABLE (1 VIEW)   Final Result      1.  No acute cardiac or pulmonary abnormalities are identified.      EC-ECHOCARDIOGRAM  COMPLETE W/O CONT    (Results Pending)        Assessment/Plan  * Pulmonary embolism, bilateral (HCC)- (present on admission)  Assessment & Plan  Presents for right leg pain and dyspnea  US shows acute R LE DVT, CTA shows acute bilateral pulmonary emboli  On room air, vitals stable  Admit to telemetry  Continue heparin drip  Echo ordered  Given 3rd DVT, this time unprovoked, recommend indefinite anticoagulation; she has tolerated xarelto before,  not sure if this is covered by their insurance now  Pending echo results, consider pulmonology consult for EKOS  Continue heparin, f/u echo if stable will transition to po eliquis.     Right leg DVT (HCC)  Assessment & Plan  Acute DVT seen on US  See PE problem         VTE prophylaxis: therapeutic anticoagulation with heparin    I have performed a physical exam and reviewed and updated ROS and Plan today (3/17/2023). In review of yesterday's note (3/16/2023), there are no changes except as documented above.      Monitoring heparin drip, monitoring xa, watching for bleeding.

## 2023-03-17 NOTE — PROGRESS NOTES
Assumed care at 0700. Bedside report received from Sim. Patient's chart and MAR reviewed. Pt sleeping at this time. P White board updated. Call light, phone and personal belongings within reach.

## 2023-03-17 NOTE — H&P
Hospital Medicine History & Physical Note    Date of Service  3/16/2023    Primary Care Physician  Saturnino Walton M.D.      Code Status  Full Code    Chief Complaint  Chief Complaint   Patient presents with    Leg Pain           Leg Swelling              History of Presenting Illness  Zora Mcneil is a 67 y.o. female who presented 3/16/2023 with leg pain. Patient with history of DVT's, who presents with right leg pain with associated dyspnea. Patient reports yesterday after pilates she noticed some behind right calf pain/soreness. She relayed it to a difficult workout. Pain worsened, and patient also had associated dyspnea and diaphoresis. She took an aspirin and presented for evaluation. Her prior DVT in left leg was provoked after a vein procedure, she completed 1 year AC for that. She then had a DVT in right leg two years ago for unclear reasons and was taken off of AC after 3 months. Patient denies any recent trauma, falls, surgery, immobility, illness.  In the ED, vital signs stable on room air. CBC and CMP unremarkable. Troponin 22 with repeat to 19. US LE shows acute R LE DVT. CTA done showing bilateral pulmonary emboli with signs of right heart strain. Patient started on heparin drip. She will be admitted for DVT/PE treatment and evaluation.    I discussed the plan of care with patient and family.    Review of Systems  Review of Systems   Constitutional:  Negative for chills and fever.   Eyes:  Negative for blurred vision and pain.   Respiratory:  Positive for cough and shortness of breath. Negative for hemoptysis, sputum production and wheezing.    Cardiovascular:  Negative for chest pain, palpitations and leg swelling.   Gastrointestinal:  Negative for abdominal pain, nausea and vomiting.   Genitourinary:  Negative for dysuria and urgency.   Musculoskeletal:  Positive for myalgias. Negative for back pain and falls.   Skin:  Negative for itching and rash.   Neurological:  Negative for  dizziness, sensory change, focal weakness, loss of consciousness and headaches.   Psychiatric/Behavioral:  Negative for substance abuse. The patient does not have insomnia.      Past Medical History   has a past medical history of Arthritis, Blood clotting disorder (HCC) (2014), Bowel habit changes, Bronchitis, Cold (04/18/2018), Dental disorder, Hyperlipidemia, Obesity, Pain (04/2018), Psychiatric problem, and Unspecified urinary incontinence.    Surgical History   has a past surgical history that includes hemorrhoidectomy; colonoscopy (2009,recheck 10); knee arthroscopy; other orthopedic surgery; bone spur excision (11/11/2011); toe arthroplasty (11/11/2011); lesion excision ortho (1/27/2014); carpal tunnel endoscopic (Right, 5/3/2017); knee arthroscopy (Left, 4/23/2018); meniscectomy, knee, medial (Left, 4/23/2018); and pr total knee arthroplasty (Left, 10/13/2022).     Family History  family history includes Arthritis in her mother; Cancer in her father.   Family history reviewed with patient. There is no family history that is pertinent to the chief complaint.     Social History   reports that she quit smoking about 43 years ago. Her smoking use included cigarettes. She has a 25.00 pack-year smoking history. She has never used smokeless tobacco. She reports current alcohol use. She reports current drug use.    Allergies  No Known Allergies    Medications  Prior to Admission Medications   Prescriptions Last Dose Informant Patient Reported? Taking?   aspirin EC (ECOTRIN) 81 MG Tablet Delayed Response 3/15/2023 at am Patient Yes Yes   Sig: Take 81 mg by mouth every day.   meloxicam (MOBIC) 7.5 MG Tab 3/15/2023 at am Patient Yes Yes   Sig: Take 7.5 mg by mouth every day.      Facility-Administered Medications: None       Physical Exam  Temp:  [36.7 °C (98 °F)] 36.7 °C (98 °F)  Pulse:  [73-89] 75  Resp:  [13-22] 14  BP: (128-167)/(67-92) 156/72  SpO2:  [93 %-98 %] 94 %  Blood Pressure : (!) 156/73   Temperature:  36.7 °C (98 °F)   Pulse: 82   Respiration: 17   Pulse Oximetry: 94 %       Physical Exam  Vitals reviewed.   Constitutional:       General: She is not in acute distress.     Appearance: She is not diaphoretic.   HENT:      Head: Normocephalic and atraumatic.      Right Ear: External ear normal.      Left Ear: External ear normal.      Nose: Nose normal. No congestion.      Mouth/Throat:      Pharynx: No oropharyngeal exudate or posterior oropharyngeal erythema.   Eyes:      Extraocular Movements: Extraocular movements intact.      Pupils: Pupils are equal, round, and reactive to light.   Cardiovascular:      Rate and Rhythm: Normal rate and regular rhythm.   Pulmonary:      Effort: Pulmonary effort is normal. No respiratory distress.      Breath sounds: Normal breath sounds. No wheezing or rales.   Abdominal:      General: Bowel sounds are normal. There is no distension.      Palpations: Abdomen is soft.      Tenderness: There is no abdominal tenderness. There is no guarding or rebound.   Musculoskeletal:         General: No swelling, tenderness, deformity or signs of injury. Normal range of motion.      Cervical back: Normal range of motion and neck supple.      Right lower leg: No edema.      Left lower leg: No edema.   Skin:     General: Skin is warm and dry.   Neurological:      General: No focal deficit present.      Mental Status: She is alert and oriented to person, place, and time.      Cranial Nerves: No cranial nerve deficit.      Sensory: No sensory deficit.      Motor: No weakness.   Psychiatric:         Mood and Affect: Mood normal.         Behavior: Behavior normal.       Laboratory:  Recent Labs     03/16/23  1115   WBC 6.9   RBC 4.50   HEMOGLOBIN 13.4   HEMATOCRIT 39.7   MCV 88.2   MCH 29.8   MCHC 33.8   RDW 41.7   PLATELETCT 163*   MPV 10.7     Recent Labs     03/16/23  1115   SODIUM 139   POTASSIUM 4.4   CHLORIDE 104   CO2 21   GLUCOSE 107*   BUN 27*   CREATININE 0.71   CALCIUM 9.5     Recent Labs      03/16/23  1115   ALTSGPT 16   ASTSGOT 22   ALKPHOSPHAT 89   TBILIRUBIN 0.7   GLUCOSE 107*     Recent Labs     03/16/23  1115   APTT 26.1   INR 1.19*     No results for input(s): NTPROBNP in the last 72 hours.      Recent Labs     03/16/23  1115 03/16/23  1536   TROPONINT 22* 19       Imaging:  CT-CTA CHEST PULMONARY ARTERY W/ RECONS   Final Result      1.  Acute pulmonary emboli in the lobar, segmental and subsegmental pulmonary arterial branches bilaterally. Associated right heart strain.   2.  Cardiomegaly.   3.  A 1.8 cm right thyroid nodule can be followed with outpatient ultrasound.      US-EXTREMITY VENOUS LOWER UNILAT RIGHT   Final Result      DX-CHEST-PORTABLE (1 VIEW)   Final Result      1.  No acute cardiac or pulmonary abnormalities are identified.      EC-ECHOCARDIOGRAM COMPLETE W/O CONT    (Results Pending)           Assessment/Plan:  Justification for Admission Status  I anticipate this patient will require at least two midnights for appropriate medical management, necessitating inpatient admission because treatment of DVT/PE is expected to take >2 midnights.    Patient will need a Telemetry bed on MEDICAL service .  The need is secondary to close cardiac monitoring in setting of acute pulmonary embolism with right heart strain.    * Pulmonary embolism, bilateral (HCC)- (present on admission)  Assessment & Plan  Presents for right leg pain and dyspnea  US shows acute R LE DVT, CTA shows acute bilateral pulmonary emboli  On room air, vitals stable  Admit to telemetry  Continue heparin drip  Echo ordered  Given 3rd DVT, this time unprovoked, recommend indefinite anticoagulation; she has tolerated xarelto before,  not sure if this is covered by their insurance now  Pending echo results, consider pulmonology consult for EKOS    Right leg DVT (HCC)  Assessment & Plan  Acute DVT seen on US  See PE problem        VTE prophylaxis: therapeutic anticoagulation with heparin drip

## 2023-03-17 NOTE — DISCHARGE PLANNING
Case Management Discharge Planning    Admission Date: 3/16/2023  GMLOS: 2.5  ALOS: 1    6-Clicks ADL Score: 24  6-Clicks Mobility Score: 23  DX: DVT     Anticipated Discharge Dispo:  Home with  . Patient reports she lives in a 2 story home.  Patients home address is [address] [place] [address]    DME Needed: No    Action(s) Taken: DC Assessment Complete (See below)    Escalations Completed: None    Medically Clear: No    Next Steps: Continue to follow for discharge needs.     Barriers to Discharge: Medical clearance    Is the patient up for discharge tomorrow: No  Care Transition Team Assessment  Elopement Risk  Legal Hold: No  Ambulatory or Self Mobile in Wheelchair: Yes  Disoriented: No  Psychiatric Symptoms: None  History of Wandering: No  Elopement this Admit: No  Vocalizing Wanting to Leave: No  Displays Behaviors, Body Language Wanting to Leave: No-Not at Risk for Elopement  Elopement Risk: Not at Risk for Elopement    Interdisciplinary Discharge Planning  Lives with - Patient's Self Care Capacity: Significant Other  Patient or legal guardian wants to designate a caregiver: No  Support Systems: Family Member(s)  Housing / Facility: [address] [place] [address]  Durable Medical Equipment: Not Applicable  Vision / Hearing Impairment  Vision Impairment : No  Hearing Impairment : No  Domestic Abuse  Have you ever been the victim of abuse or violence?: No  Physical Abuse or Sexual Abuse: No  Verbal Abuse or Emotional Abuse: No  Possible Abuse/Neglect Reported to:: Not Applicable

## 2023-03-17 NOTE — CARE PLAN
The patient is Stable - Low risk of patient condition declining or worsening    Shift Goals  Clinical Goals: heparin gtt, pain management  Patient Goals: comfort    Progress made toward(s) clinical / shift goals:  continues on hep gtt      Problem: Knowledge Deficit - Standard  Goal: Patient and family/care givers will demonstrate understanding of plan of care, disease process/condition, diagnostic tests and medications  3/17/2023 0456 by Sim Collins R.N.  Outcome: Progressing  3/17/2023 0456 by Sim Collins R.N.  Outcome: Progressing     Problem: Hemodynamics  Goal: Patient's hemodynamics, fluid balance and neurologic status will be stable or improve  Outcome: Progressing     Problem: Respiratory  Goal: Patient will achieve/maintain optimum respiratory ventilation and gas exchange  Outcome: Progressing     Problem: Fluid Volume  Goal: Fluid volume balance will be maintained  Outcome: Progressing

## 2023-03-18 ENCOUNTER — PHARMACY VISIT (OUTPATIENT)
Dept: PHARMACY | Facility: MEDICAL CENTER | Age: 68
End: 2023-03-18
Payer: COMMERCIAL

## 2023-03-18 VITALS
WEIGHT: 172.84 LBS | HEART RATE: 75 BPM | TEMPERATURE: 98.1 F | SYSTOLIC BLOOD PRESSURE: 129 MMHG | RESPIRATION RATE: 18 BRPM | DIASTOLIC BLOOD PRESSURE: 68 MMHG | OXYGEN SATURATION: 97 % | BODY MASS INDEX: 27.78 KG/M2 | HEIGHT: 66 IN

## 2023-03-18 LAB
ANION GAP SERPL CALC-SCNC: 13 MMOL/L (ref 7–16)
BUN SERPL-MCNC: 17 MG/DL (ref 8–22)
CALCIUM SERPL-MCNC: 9.4 MG/DL (ref 8.5–10.5)
CHLORIDE SERPL-SCNC: 105 MMOL/L (ref 96–112)
CO2 SERPL-SCNC: 19 MMOL/L (ref 20–33)
CREAT SERPL-MCNC: 0.75 MG/DL (ref 0.5–1.4)
ERYTHROCYTE [DISTWIDTH] IN BLOOD BY AUTOMATED COUNT: 41.2 FL (ref 35.9–50)
GFR SERPLBLD CREATININE-BSD FMLA CKD-EPI: 86 ML/MIN/1.73 M 2
GLUCOSE SERPL-MCNC: 103 MG/DL (ref 65–99)
HCT VFR BLD AUTO: 37.5 % (ref 37–47)
HGB BLD-MCNC: 12.8 G/DL (ref 12–16)
MCH RBC QN AUTO: 30 PG (ref 27–33)
MCHC RBC AUTO-ENTMCNC: 34.1 G/DL (ref 33.6–35)
MCV RBC AUTO: 87.8 FL (ref 81.4–97.8)
PLATELET # BLD AUTO: 173 K/UL (ref 164–446)
PMV BLD AUTO: 10.4 FL (ref 9–12.9)
POTASSIUM SERPL-SCNC: 3.9 MMOL/L (ref 3.6–5.5)
RBC # BLD AUTO: 4.27 M/UL (ref 4.2–5.4)
SODIUM SERPL-SCNC: 137 MMOL/L (ref 135–145)
WBC # BLD AUTO: 7.9 K/UL (ref 4.8–10.8)

## 2023-03-18 PROCEDURE — 36415 COLL VENOUS BLD VENIPUNCTURE: CPT

## 2023-03-18 PROCEDURE — A9270 NON-COVERED ITEM OR SERVICE: HCPCS | Performed by: HOSPITALIST

## 2023-03-18 PROCEDURE — 700102 HCHG RX REV CODE 250 W/ 637 OVERRIDE(OP): Performed by: HOSPITALIST

## 2023-03-18 PROCEDURE — 80048 BASIC METABOLIC PNL TOTAL CA: CPT

## 2023-03-18 PROCEDURE — 85027 COMPLETE CBC AUTOMATED: CPT

## 2023-03-18 PROCEDURE — 700102 HCHG RX REV CODE 250 W/ 637 OVERRIDE(OP): Performed by: STUDENT IN AN ORGANIZED HEALTH CARE EDUCATION/TRAINING PROGRAM

## 2023-03-18 PROCEDURE — A9270 NON-COVERED ITEM OR SERVICE: HCPCS | Performed by: STUDENT IN AN ORGANIZED HEALTH CARE EDUCATION/TRAINING PROGRAM

## 2023-03-18 PROCEDURE — 99239 HOSP IP/OBS DSCHRG MGMT >30: CPT | Performed by: HOSPITALIST

## 2023-03-18 PROCEDURE — RXMED WILLOW AMBULATORY MEDICATION CHARGE: Performed by: HOSPITALIST

## 2023-03-18 RX ADMIN — APIXABAN 10 MG: 5 TABLET, FILM COATED ORAL at 06:18

## 2023-03-18 RX ADMIN — DOCUSATE SODIUM 50 MG AND SENNOSIDES 8.6 MG 2 TABLET: 8.6; 5 TABLET, FILM COATED ORAL at 06:18

## 2023-03-18 NOTE — CARE PLAN
The patient is Stable - Low risk of patient condition declining or worsening    Shift Goals  Clinical Goals: discharge planning, monitor vitals  Patient Goals: comfort    Progress made toward(s) clinical / shift goals:      Pending discharge      Problem: Knowledge Deficit - Standard  Goal: Patient and family/care givers will demonstrate understanding of plan of care, disease process/condition, diagnostic tests and medications  Outcome: Progressing     Problem: Hemodynamics  Goal: Patient's hemodynamics, fluid balance and neurologic status will be stable or improve  Outcome: Progressing

## 2023-03-19 NOTE — DISCHARGE SUMMARY
Discharge Summary    CHIEF COMPLAINT ON ADMISSION  Chief Complaint   Patient presents with    Leg Pain           Leg Swelling              Reason for Admission  pain in leg     Admission Date  3/16/2023    CODE STATUS  Full code    HPI & HOSPITAL COURSE  Please review H&P for specific information  Zora Mcneli is a 67 y.o. female who presented 3/16/2023 with leg pain. Patient with history of DVT's, who presents with right leg pain with associated dyspnea. Patient reports yesterday after pilates she noticed some behind right calf pain/soreness. She relayed it to a difficult workout. Pain worsened, and patient also had associated dyspnea and diaphoresis. She took an aspirin and presented for evaluation. Her prior DVT in left leg was provoked after a vein procedure, she completed 1 year AC for that. She then had a DVT in right leg two years ago for unclear reasons and was taken off of AC after 3 months. Patient denies any recent trauma, falls, surgery, immobility, illness.  In the ED, vital signs stable on room air. CBC and CMP unremarkable. Troponin 22 with repeat to 19. US LE shows acute R LE DVT. CTA done showing bilateral pulmonary emboli with signs of right heart strain. Patient started on heparin drip. She will be admitted for DVT/PE treatment and evaluation.    Patient initially started on heparin drip, echocardiogram did not show any right-sided heart strain, patient has transition into p.o. Eliquis, patient is alert oriented follows commands denies any chest pain shortness of breath palpitation no lower extremity edema or pain, patient at this time is going to be discharged home on Eliquis, medication has been approved, patient and patient  have expressed understanding of plan of care and agree with either question have been answered.  They understand that patient is to follow-up with primary care physician for coagulation work-up with her primary care physician.  For now patient most likely  will require anticoagulation for the rest of her life since this is her third episode.      Therefore, she is discharged in good and stable condition to home with close outpatient follow-up.    The patient met 2-midnight criteria for an inpatient stay at the time of discharge.    Discharge Date  3/18/2023    FOLLOW UP ITEMS POST DISCHARGE  Primary care physician    DISCHARGE DIAGNOSES  Principal Problem:    Pulmonary embolism, bilateral (HCC) POA: Yes  Active Problems:    Right leg DVT (HCC) POA: Unknown  Resolved Problems:    * No resolved hospital problems. *      FOLLOW UP  No future appointments.  Saturnino Walton M.D.  [address] [facility]  [address]  [facility] [address]  [phone]    Follow up in 1 week(s)        MEDICATIONS ON DISCHARGE     Medication List        START taking these medications        Instructions   * Eliquis 5mg Tabs  Generic drug: apixaban   Take 2 Tablets by mouth 2 times a day for 6 days, THEN take 1 tablet 2 times a day thereafter. Indications: DVT/PE  Dose: 10 mg     * apixaban 5mg Tabs  Start taking on: March 24, 2023  Commonly known as: ELIQUIS   Take 1 Tablet by mouth 2 times a day for 53 days. Indications: DVT/PE  Dose: 5 mg           * This list has 2 medication(s) that are the same as other medications prescribed for you. Read the directions carefully, and ask your doctor or other care provider to review them with you.                STOP taking these medications      aspirin EC 81 MG Tbec  Commonly known as: ECOTRIN     meloxicam 7.5 MG Tabs  Commonly known as: MOBIC              Allergies  No Known Allergies    DIET  Healthy diet    ACTIVITY  As tolerated.  Weight bearing as tolerated    CONSULTATIONS  None    PROCEDURES  None    LABORATORY  Lab Results   Component Value Date    SODIUM 137 03/18/2023    POTASSIUM 3.9 03/18/2023    CHLORIDE 105 03/18/2023    CO2 19 (L) 03/18/2023    GLUCOSE 103 (H) 03/18/2023    BUN 17 03/18/2023    CREATININE 0.75 03/18/2023    CREATININE 0.81 12/07/2010     GLOMRATE >59 12/07/2010        Lab Results   Component Value Date    WBC 7.9 03/18/2023    WBC 6.0 12/07/2010    HEMOGLOBIN 12.8 03/18/2023    HEMATOCRIT 37.5 03/18/2023    PLATELETCT 173 03/18/2023        Total time of the discharge process exceeds 40 minutes.

## 2023-03-20 ENCOUNTER — APPOINTMENT (OUTPATIENT)
Dept: RADIOLOGY | Facility: MEDICAL CENTER | Age: 68
End: 2023-03-20
Attending: EMERGENCY MEDICINE
Payer: MEDICARE

## 2023-03-20 ENCOUNTER — HOSPITAL ENCOUNTER (EMERGENCY)
Facility: MEDICAL CENTER | Age: 68
End: 2023-03-20
Attending: EMERGENCY MEDICINE
Payer: MEDICARE

## 2023-03-20 VITALS
RESPIRATION RATE: 16 BRPM | TEMPERATURE: 98.6 F | HEART RATE: 75 BPM | HEIGHT: 66 IN | SYSTOLIC BLOOD PRESSURE: 111 MMHG | OXYGEN SATURATION: 96 % | BODY MASS INDEX: 27.67 KG/M2 | DIASTOLIC BLOOD PRESSURE: 65 MMHG | WEIGHT: 172.18 LBS

## 2023-03-20 DIAGNOSIS — I82.551 CHRONIC DEEP VEIN THROMBOSIS (DVT) OF RIGHT PERONEAL VEIN (HCC): ICD-10-CM

## 2023-03-20 DIAGNOSIS — R60.9 PERIPHERAL EDEMA: ICD-10-CM

## 2023-03-20 LAB
ALBUMIN SERPL BCP-MCNC: 4.2 G/DL (ref 3.2–4.9)
ALBUMIN/GLOB SERPL: 1.2 G/DL
ALP SERPL-CCNC: 85 U/L (ref 30–99)
ALT SERPL-CCNC: 13 U/L (ref 2–50)
ANION GAP SERPL CALC-SCNC: 15 MMOL/L (ref 7–16)
AST SERPL-CCNC: 15 U/L (ref 12–45)
BASOPHILS # BLD AUTO: 0.3 % (ref 0–1.8)
BASOPHILS # BLD: 0.02 K/UL (ref 0–0.12)
BILIRUB SERPL-MCNC: 0.7 MG/DL (ref 0.1–1.5)
BUN SERPL-MCNC: 23 MG/DL (ref 8–22)
CALCIUM ALBUM COR SERPL-MCNC: 9.4 MG/DL (ref 8.5–10.5)
CALCIUM SERPL-MCNC: 9.6 MG/DL (ref 8.5–10.5)
CHLORIDE SERPL-SCNC: 101 MMOL/L (ref 96–112)
CO2 SERPL-SCNC: 21 MMOL/L (ref 20–33)
CREAT SERPL-MCNC: 0.75 MG/DL (ref 0.5–1.4)
EKG IMPRESSION: NORMAL
EOSINOPHIL # BLD AUTO: 0.09 K/UL (ref 0–0.51)
EOSINOPHIL NFR BLD: 1.3 % (ref 0–6.9)
ERYTHROCYTE [DISTWIDTH] IN BLOOD BY AUTOMATED COUNT: 39.9 FL (ref 35.9–50)
GFR SERPLBLD CREATININE-BSD FMLA CKD-EPI: 86 ML/MIN/1.73 M 2
GLOBULIN SER CALC-MCNC: 3.6 G/DL (ref 1.9–3.5)
GLUCOSE SERPL-MCNC: 111 MG/DL (ref 65–99)
HCT VFR BLD AUTO: 41.9 % (ref 37–47)
HGB BLD-MCNC: 14.1 G/DL (ref 12–16)
IMM GRANULOCYTES # BLD AUTO: 0.04 K/UL (ref 0–0.11)
IMM GRANULOCYTES NFR BLD AUTO: 0.6 % (ref 0–0.9)
LYMPHOCYTES # BLD AUTO: 1.33 K/UL (ref 1–4.8)
LYMPHOCYTES NFR BLD: 19.4 % (ref 22–41)
MCH RBC QN AUTO: 30 PG (ref 27–33)
MCHC RBC AUTO-ENTMCNC: 33.7 G/DL (ref 33.6–35)
MCV RBC AUTO: 89.1 FL (ref 81.4–97.8)
MONOCYTES # BLD AUTO: 0.51 K/UL (ref 0–0.85)
MONOCYTES NFR BLD AUTO: 7.4 % (ref 0–13.4)
NEUTROPHILS # BLD AUTO: 4.86 K/UL (ref 2–7.15)
NEUTROPHILS NFR BLD: 71 % (ref 44–72)
NRBC # BLD AUTO: 0 K/UL
NRBC BLD-RTO: 0 /100 WBC
NT-PROBNP SERPL IA-MCNC: 96 PG/ML (ref 0–125)
PLATELET # BLD AUTO: 252 K/UL (ref 164–446)
PMV BLD AUTO: 10.1 FL (ref 9–12.9)
POTASSIUM SERPL-SCNC: 3.9 MMOL/L (ref 3.6–5.5)
PROT SERPL-MCNC: 7.8 G/DL (ref 6–8.2)
RBC # BLD AUTO: 4.7 M/UL (ref 4.2–5.4)
SODIUM SERPL-SCNC: 137 MMOL/L (ref 135–145)
TROPONIN T SERPL-MCNC: 8 NG/L (ref 6–19)
WBC # BLD AUTO: 6.9 K/UL (ref 4.8–10.8)

## 2023-03-20 PROCEDURE — 36415 COLL VENOUS BLD VENIPUNCTURE: CPT

## 2023-03-20 PROCEDURE — 80053 COMPREHEN METABOLIC PANEL: CPT

## 2023-03-20 PROCEDURE — 99284 EMERGENCY DEPT VISIT MOD MDM: CPT

## 2023-03-20 PROCEDURE — 71045 X-RAY EXAM CHEST 1 VIEW: CPT

## 2023-03-20 PROCEDURE — 84484 ASSAY OF TROPONIN QUANT: CPT

## 2023-03-20 PROCEDURE — 93005 ELECTROCARDIOGRAM TRACING: CPT | Performed by: EMERGENCY MEDICINE

## 2023-03-20 PROCEDURE — 85025 COMPLETE CBC W/AUTO DIFF WBC: CPT

## 2023-03-20 PROCEDURE — 93971 EXTREMITY STUDY: CPT | Mod: RT

## 2023-03-20 PROCEDURE — 83880 ASSAY OF NATRIURETIC PEPTIDE: CPT

## 2023-03-20 ASSESSMENT — FIBROSIS 4 INDEX: FIB4 SCORE: 2.19

## 2023-03-20 NOTE — DISCHARGE INSTRUCTIONS
As we discussed, the clot appears more chronic today.  At this time, there is no way to quickly remove it, but will continue to dissolve.  The vascular surgeon recommends compression, either wrapping the leg and an Ace wrap starting at the foot, or using thigh-high compression stockings.  Please follow-up with your primary care physician, return to the emergency department if you develop any new or worsening symptoms with any worsening swelling, shortness of breath, or if you have any further concerns.

## 2023-03-20 NOTE — ED NOTES
EKG done by this RN and signed by ERP. Pt provided water per ERP and is tolerating well. Pt and  at bedside updated regarding status waiting for test results and for ultrasound, both demonstrated understanding.

## 2023-03-20 NOTE — ED NOTES
Report given to Lizzeth JACK RN. At this time pt is resting in bed with  at bedside. Pt is in no apparent distress and has even and unlabored breaths at this time.

## 2023-03-20 NOTE — ED PROVIDER NOTES
Emergency Physician Note    Chief Complaint:  Chief Complaint   Patient presents with    Leg Swelling     Pt was diagnosed with DVT to R leg on 3/16.  Pt now reports increased swelling to R leg      Limitation to History:  Select: : None    HPI/ROS   Outside Historians:        External Records Reviewed:   Inpatient Notes Ms. Mcneil was admitted to this facility last week.  Hospital medicine physician's note reviewed from 3/18/2023, this was her discharge summary.  She was admitted 3/16/2023 for evaluation of leg pain.  Noted that she has a history of DVTs.  In the emergency department, ultrasound of the lower extremity shows an acute right lower extremity DVT.  CTA was done that showed bilateral pulmonary emboli with signs of right heart strain.  She was started on a heparin drip.  Echocardiogram did not show right-sided heart strain, she was transitioned to oral Eliquis.  She was discharged home on Eliquis with close outpatient follow-up.  Due to history of 2 prior DVTs which were thought to be provoked, she will likely require lifelong anticoagulation.    HPI:  Zora Mcneil is a 67 y.o. female who presents to the emergency department today for evaluation of worsening pain and swelling localized to the left leg.  She was admitted to this facility last week for evaluation and treatment of a right lower extremity DVT, unprovoked, as well as bilateral pulmonary emboli.  She was started on a heparin drip then transitioned to Eliquis.  She has been taking Eliquis since she was discharged 4 days ago, and has been completely compliant with that medication.  Despite taking that medication, she has had progressively worsening pain and swelling over the last 2 to 3 days.  She has not had any new or worsening chest symptoms, she states that her jaw at times felt funny, and when she read her discharge paperwork it said return to the emergency department if she had any jaw pain, though she does not  "really have any pain at this time.  Her primary concern is worsening right leg pain and swelling, she describes pain that is exacerbated by movement, and pain has progressed to the point where it is interfering with her ability to easily walk, and go up and down stairs.  Her  states that he used to work for the fire department, and was concerned about her abnormal sensation in her jaw.  He also confirms that she had no associated worsening chest pain or shortness of breath.    PAST MEDICAL HISTORY  Past Medical History:   Diagnosis Date    Arthritis     fingers    Blood clotting disorder (HCC) 2014    xarelto x 1 year, left leg    Bowel habit changes     Bronchitis     Cold 04/18/2018    \"Minor cold one week ago\" Denies productive cough, SOB    Dental disorder     upper frontal bridge    Hyperlipidemia     Obesity     Pain 04/2018    left knee, back    Psychiatric problem     depression    Unspecified urinary incontinence     stress     SURGICAL HISTORY  Past Surgical History:   Procedure Laterality Date    PB TOTAL KNEE ARTHROPLASTY Left 10/13/2022    Procedure: LEFT TOTAL KNEE ARTHROPLASTY;  Surgeon: Rick Portillo M.D.;  Location: [facility];  Service: Orthopedics    KNEE ARTHROSCOPY Left 4/23/2018    Procedure: KNEE ARTHROSCOPY;  Surgeon: Tom Moore M.D.;  Location: SURGERY [facility];  Service: Orthopedics    MENISCECTOMY, KNEE, MEDIAL Left 4/23/2018    Procedure: MEDIAL MENISCECTOMY - PARTIAL;  Surgeon: Tom Moore M.D.;  Location: [facility];  Service: Orthopedics    CARPAL TUNNEL ENDOSCOPIC Right 5/3/2017    Procedure: CARPAL TUNNEL ENDOSCOPIC;  Surgeon: Iván Edge M.D.;  Location: [facility];  Service:     LESION EXCISION ORTHO  1/27/2014    Performed by Fermín Edge M.D. at SURGERY SAME DAY [facility]    BONE SPUR EXCISION  11/11/2011    Performed by REGINE PERERA at SURGERY SAME DAY [facility]    TOE " "ARTHROPLASTY  11/11/2011    Performed by REGINE PERERA at SURGERY SAME DAY [facility]    COLONOSCOPY  2009,recheck 10    HEMORRHOIDECTOMY      KNEE ARTHROSCOPY      OTHER ORTHOPEDIC SURGERY      toe joint surgery     FAMILY HISTORY  Family History   Problem Relation Age of Onset    Cancer Father         lung    Arthritis Mother         rheumatoid arthritis     SOCIAL HISTORY   reports that she quit smoking about 43 years ago. Her smoking use included cigarettes. She has a 25.00 pack-year smoking history. She has never used smokeless tobacco. She reports current alcohol use. She reports current drug use.    CURRENT MEDICATIONS  Previous Medications    APIXABAN (ELIQUIS) 5MG TAB    Take 2 Tablets by mouth 2 times a day for 6 days, THEN take 1 tablet 2 times a day thereafter. Indications: DVT/PE    APIXABAN (ELIQUIS) 5MG TAB    Take 1 Tablet by mouth 2 times a day for 53 days. Indications: DVT/PE     ALLERGIES  Patient has no known allergies.    PHYSICAL EXAM  Vital Signs: /67   Pulse 78   Temp 36.4 °C (97.6 °F) (Temporal)   Resp 16   Ht 1.676 m (5' 6\")   Wt 78.1 kg (172 lb 2.9 oz)   SpO2 94%   BMI 27.79 kg/m²   Constitutional: Alert, no acute distress  HENT: Normocephalic, atraumatic.  Neck: Supple, normal range of motion, no stridor  Cardiovascular: Regular rate and rhythm, no tachycardia.  Pulmonary: Breath sounds clear and equal bilaterally, no wheezing, no coarse breath sounds, room air oxygen saturation 94%, no tachypnea.  Abdomen: Soft, nondistended.   Skin: Warm, dry, no rashes or lesions  Back: No pain with active range of motion  Musculoskeletal: Right lower extremity with mild tenderness to palpation along the posterior calf and popliteal fossa, soft compartments throughout the right lower extremity, distal extremities warm and well perfused, 1+ DP pulse present.  Neurologic: Alert, oriented, normal motor function, no speech deficits  Psychiatric: Normal and appropriate mood and " affect    Diagnostic Studies & Procedures    Labs:  All labs reviewed by me as noted below.     EK Lead EKG interpreted by me as noted below:  Rate 76, normal sinus rhythm, no ST elevation or depression, no T wave inversions, no ectopy, normal voltage.    Radiology:  The attending Emergency Physician has independently interpreted the following imaging:  I independently reviewed chest x-ray imaging, no localized infiltrates, no significant changes compared to prior, prominent mediastinum likely due to portable technique.     US-EXTREMITY VENOUS LOWER UNILAT RIGHT   Final Result      DX-CHEST-PORTABLE (1 VIEW)   Final Result      Linear atelectasis within the right lung base.          Course and Medical Decision Making    ED Observation Status? Yes; Differential diagnosis includes severe or life threatening conditions including anticoagulation failure, worsening clot burden, heart failure due to worsening pulmonary emboli.  Due to diagnostic uncertainty at this time, patient placed in observation status at 11:03 PM, 3/20/2023    Observation plan is as follows: Lab work, advanced imaging including ultrasound, cardiac evaluation, ongoing cardiac monitoring    Upon Reevaluation, the patient's condition has: Remained stable with no acute process found    Patient discharged from ED Observation status at 1:23 PM, 3/20/2023.    Initial Assessment and Plan  Care Narrative: Ms. Mcneil presents to the emergency department today for evaluation of worsening swelling to the right lower extremity after she was diagnosed with a DVT last week.  She is on Eliquis, I did confirm that she is on the appropriate dose for new diagnosis of DVT, and has been entirely compliant with this medication.  She is not, fortunately, had any new worsening chest pain or shortness of breath.  She has no hypoxia and no evidence of respiratory distress on arrival to the emergency department.  She does have some mild discomfort on palpation of the  right lower extremity, there are no signs of compartment syndrome.  She has no fever, no evidence of infectious etiology.    On laboratory evaluation CMP is reassuring without any significant abnormalities.  proBNP and high-sensitivity troponin are both within normal limits, no evidence of myocardial injury or cardiac compromise due to previously diagnosed pulmonary emboli.  CBC is entirely within normal limits, hemoglobin is 14.1.    Chest x-ray with no evidence of pulmonary edema.  Linear atelectasis within the right lung base noted.     Right lower extremity ultrasound demonstrates a chronic occlusive thrombus in the popliteal and gastrocnemius veins.  Chronic partially occlusive thrombus with flow recanalization in the posterior tibial and peroneal veins. Right lower extremity imaging report was compared to previous which was performed on 3/16/2023.  This demonstrated an acute to subacute occlusive DVT in the distal popliteal, proximal gastrinomas, proximal peroneal, and proximal posterior tibial veins.  Thrombus extending to the proximal popliteal that is loosely attached.  All distal posterior tibial and peroneal veins are partially thrombosed.    I reviewed her ultrasound imaging with Dr. Rincon, vascular surgeon on-call today.  He looked at the images, all thrombus appears to be chronic without an acute component, no indication for emergent vascular intervention.  His recommendation is for thigh-high compression, as well as attention to keeping the leg elevated whenever possible.  He recommends utilizing an Ace wrap, or thigh-high compression stockings.    I reviewed Dr. Rincon's recommendations with the patient and her .  Plan is for discharge home with compression stockings, as well as elevation whenever possible.  She will continue to follow-up with her primary care physician, with plan for outpatient referral to vascular if symptoms do not improve.  At this time, there is no indication for new  clot burden, believe Eliquis is therapeutic, without evidence of failure of this medication regimen. Return precautions were discussed with the patient, and provided in written form with the patient's discharge instructions.       Additional Problems and Disposition    I have discussed management of the patient with the following physician: Dr. Grier (Vascular)    Escalation of care considered, and ultimately not performed: Hospitalization considered, however cardiopulmonary evaluation remained reassuring without any new or worsening symptoms, no indication for emergent intervention with regard to her right lower extremity DVT.  She can be safely discharged home in stable condition.    Disposition:  The patient will return for new or worsening symptoms and is stable at the time of discharge.    The patient is referred to a primary physician for blood pressure management, diabetic screening, and for all other preventative health concerns.    DISPOSITION:  Patient will be discharged home in stable condition.    FOLLOW UP:  Saturnino Walton M.D.  [address] [facility] [id number]  [phone]    Schedule an appointment as soon as possible for a visit       [facility], Emergency Dept  [address] [place] [id number] [phone]  Go to   If symptoms worsen    FINAL IMPRESSION   1. Peripheral edema    2. Chronic deep vein thrombosis (DVT) of right peroneal vein (HCC)        The note accurately reflects work and decisions made by me.  Jacki Henderson M.D.  3/20/2023  1:29 PM

## 2023-03-20 NOTE — ED TRIAGE NOTES
Zora Mcneil  67 y.o. female  Chief Complaint   Patient presents with    Leg Swelling     Pt was diagnosed with DVT to R leg on 3/16.  Pt now reports increased swelling to R leg       Pt amb to triage with steady gait for above complaint. Pt was and admitted on 3/15 for DVT to R leg and multiple PEs.  Pt was discharged home on Eloquis   Pt is alert and oriented, speaking in full sentences, follows commands and responds appropriately to questions. Not in any apparent distress. Respirations are even and unlabored.  Pt placed in lobby. Pt educated on triage process. Pt encouraged to alert staff for any changes.  This RN in appropriate PPE during encounter.  Pt placed in mask as well

## 2023-03-20 NOTE — ED NOTES
Pt ambulated from lobby to Red 8 without assistance, tolerated fairly well. Pt placed in hospital gown and is now sitting up in bed talking to  at bedside with even and unlabored breaths, in no apparent distress at this time. Pt states she was discharged from here on Saturday afternoon and noticed some increased swelling to right leg (especially behind knee and around ankle) yesterday that is continuing to get worse. Pt has been taking Eliquis as ordered. Will continue to monitor pt while awaiting orders.

## 2023-03-22 ENCOUNTER — HOSPITAL ENCOUNTER (EMERGENCY)
Facility: MEDICAL CENTER | Age: 68
End: 2023-03-22
Attending: EMERGENCY MEDICINE
Payer: MEDICARE

## 2023-03-22 VITALS
DIASTOLIC BLOOD PRESSURE: 77 MMHG | WEIGHT: 172 LBS | HEIGHT: 66 IN | OXYGEN SATURATION: 96 % | RESPIRATION RATE: 19 BRPM | BODY MASS INDEX: 27.64 KG/M2 | HEART RATE: 81 BPM | TEMPERATURE: 98.7 F | SYSTOLIC BLOOD PRESSURE: 142 MMHG

## 2023-03-22 DIAGNOSIS — I82.4Y1 DEEP VEIN THROMBOSIS (DVT) OF PROXIMAL VEIN OF RIGHT LOWER EXTREMITY, UNSPECIFIED CHRONICITY (HCC): ICD-10-CM

## 2023-03-22 DIAGNOSIS — M79.604 PAIN OF RIGHT LOWER EXTREMITY: ICD-10-CM

## 2023-03-22 DIAGNOSIS — R79.9 ELEVATED BUN: ICD-10-CM

## 2023-03-22 LAB
ALBUMIN SERPL BCP-MCNC: 3.9 G/DL (ref 3.2–4.9)
ALBUMIN/GLOB SERPL: 1.1 G/DL
ALP SERPL-CCNC: 77 U/L (ref 30–99)
ALT SERPL-CCNC: 12 U/L (ref 2–50)
ANION GAP SERPL CALC-SCNC: 12 MMOL/L (ref 7–16)
APTT PPP: 30.2 SEC (ref 24.7–36)
AST SERPL-CCNC: 17 U/L (ref 12–45)
BASOPHILS # BLD AUTO: 0.3 % (ref 0–1.8)
BASOPHILS # BLD: 0.02 K/UL (ref 0–0.12)
BILIRUB SERPL-MCNC: 0.6 MG/DL (ref 0.1–1.5)
BUN SERPL-MCNC: 28 MG/DL (ref 8–22)
CALCIUM ALBUM COR SERPL-MCNC: 9.2 MG/DL (ref 8.5–10.5)
CALCIUM SERPL-MCNC: 9.1 MG/DL (ref 8.5–10.5)
CHLORIDE SERPL-SCNC: 100 MMOL/L (ref 96–112)
CO2 SERPL-SCNC: 22 MMOL/L (ref 20–33)
CREAT SERPL-MCNC: 0.78 MG/DL (ref 0.5–1.4)
EOSINOPHIL # BLD AUTO: 0.09 K/UL (ref 0–0.51)
EOSINOPHIL NFR BLD: 1.3 % (ref 0–6.9)
ERYTHROCYTE [DISTWIDTH] IN BLOOD BY AUTOMATED COUNT: 39.1 FL (ref 35.9–50)
GFR SERPLBLD CREATININE-BSD FMLA CKD-EPI: 82 ML/MIN/1.73 M 2
GLOBULIN SER CALC-MCNC: 3.5 G/DL (ref 1.9–3.5)
GLUCOSE SERPL-MCNC: 100 MG/DL (ref 65–99)
HCT VFR BLD AUTO: 36.8 % (ref 37–47)
HGB BLD-MCNC: 12.4 G/DL (ref 12–16)
IMM GRANULOCYTES # BLD AUTO: 0.03 K/UL (ref 0–0.11)
IMM GRANULOCYTES NFR BLD AUTO: 0.4 % (ref 0–0.9)
INR PPP: 1.61 (ref 0.87–1.13)
LYMPHOCYTES # BLD AUTO: 1.18 K/UL (ref 1–4.8)
LYMPHOCYTES NFR BLD: 17.6 % (ref 22–41)
MCH RBC QN AUTO: 29.5 PG (ref 27–33)
MCHC RBC AUTO-ENTMCNC: 33.7 G/DL (ref 33.6–35)
MCV RBC AUTO: 87.6 FL (ref 81.4–97.8)
MONOCYTES # BLD AUTO: 0.6 K/UL (ref 0–0.85)
MONOCYTES NFR BLD AUTO: 8.9 % (ref 0–13.4)
NEUTROPHILS # BLD AUTO: 4.79 K/UL (ref 2–7.15)
NEUTROPHILS NFR BLD: 71.5 % (ref 44–72)
NRBC # BLD AUTO: 0 K/UL
NRBC BLD-RTO: 0 /100 WBC
PLATELET # BLD AUTO: 292 K/UL (ref 164–446)
PMV BLD AUTO: 10.3 FL (ref 9–12.9)
POTASSIUM SERPL-SCNC: 3.9 MMOL/L (ref 3.6–5.5)
PROT SERPL-MCNC: 7.4 G/DL (ref 6–8.2)
PROTHROMBIN TIME: 18.8 SEC (ref 12–14.6)
RBC # BLD AUTO: 4.2 M/UL (ref 4.2–5.4)
SODIUM SERPL-SCNC: 134 MMOL/L (ref 135–145)
WBC # BLD AUTO: 6.7 K/UL (ref 4.8–10.8)

## 2023-03-22 PROCEDURE — A9270 NON-COVERED ITEM OR SERVICE: HCPCS | Performed by: EMERGENCY MEDICINE

## 2023-03-22 PROCEDURE — 85025 COMPLETE CBC W/AUTO DIFF WBC: CPT

## 2023-03-22 PROCEDURE — 85610 PROTHROMBIN TIME: CPT

## 2023-03-22 PROCEDURE — 80053 COMPREHEN METABOLIC PANEL: CPT

## 2023-03-22 PROCEDURE — 85730 THROMBOPLASTIN TIME PARTIAL: CPT

## 2023-03-22 PROCEDURE — 36415 COLL VENOUS BLD VENIPUNCTURE: CPT

## 2023-03-22 PROCEDURE — 700102 HCHG RX REV CODE 250 W/ 637 OVERRIDE(OP): Performed by: EMERGENCY MEDICINE

## 2023-03-22 PROCEDURE — 99284 EMERGENCY DEPT VISIT MOD MDM: CPT

## 2023-03-22 RX ORDER — OXYCODONE HYDROCHLORIDE 5 MG/1
5 TABLET ORAL EVERY 4 HOURS PRN
Status: DISCONTINUED | OUTPATIENT
Start: 2023-03-22 | End: 2023-03-22 | Stop reason: HOSPADM

## 2023-03-22 RX ORDER — OXYCODONE HYDROCHLORIDE 5 MG/1
5 TABLET ORAL EVERY 4 HOURS PRN
Qty: 20 TABLET | Refills: 0 | Status: SHIPPED | OUTPATIENT
Start: 2023-03-22 | End: 2023-03-26

## 2023-03-22 RX ADMIN — OXYCODONE HYDROCHLORIDE 5 MG: 5 TABLET ORAL at 08:47

## 2023-03-22 ASSESSMENT — FIBROSIS 4 INDEX: FIB4 SCORE: 1.14

## 2023-03-22 NOTE — ED PROVIDER NOTES
ED Provider Note    CHIEF COMPLAINT  Chief Complaint   Patient presents with    Leg Pain     Right leg - known DVT in leg - severe pain     Leg Swelling     Swelling above left knee - knee surgery in October        EXTERNAL RECORDS REVIEWED  Inpatient Notes hospital and ER notes reviewed for the last week    HPI/ROS  LIMITATION TO HISTORY   Select: : None  OUTSIDE HISTORIAN(S):  Family  at bedside    Zora Mcneil is a 67 y.o. female who presents to the emergency department with persistent and slightly worsening right leg pain.  Most notably to the right ankle and arch of foot.  Past medical history as documented below.  Patient was diagnosed with right lower extremity DVT and pulmonary embolus earlier in the week.  She had a brief course of inpatient hospitalization and ultimately discharged.  During her treatment course she was initiated on heparin and then transition to Eliquis.  She was seen 48 hours ago with the same complaint as today.  However given return precautions and worsening pain she decided come to the emergency department.     at bedside notes that she did have prior left knee surgery roughly 6 months ago.  The patient did have a complicated course from a pain management standpoint.  She is on multiple adjuncts to include oxycodone, Tylenol and tramadol.    Today she was having significant difficulty moving around her house even despite using a cane for an assistive device.  Due to this worsening pain and being less mobile or able to do her activities of daily living as was otherwise expected at the time of discharge she again she has heightened concern and therefore can return to the ER today.    Denies any fever chills.  No shortness of breath.  No chest pain.     continues to add additional history and that she is now having some left knee discomfort.  Again he notes that she has had a prior knee surgery but was concerned about possible worsening clot burden  "therein.    PAST MEDICAL HISTORY   has a past medical history of Arthritis, Blood clotting disorder (HCC) (), Bowel habit changes, Bronchitis, Cold (2018), Dental disorder, Hyperlipidemia, Obesity, Pain (2018), Psychiatric problem, and Unspecified urinary incontinence.    SURGICAL HISTORY   has a past surgical history that includes hemorrhoidectomy; colonoscopy (,recheck 10); knee arthroscopy; other orthopedic surgery; bone spur excision (2011); toe arthroplasty (2011); lesion excision ortho (2014); carpal tunnel endoscopic (Right, 5/3/2017); knee arthroscopy (Left, 2018); meniscectomy, knee, medial (Left, 2018); and total knee arthroplasty (Left, 10/13/2022).    FAMILY HISTORY  Family History   Problem Relation Age of Onset    Cancer Father         lung    Arthritis Mother         rheumatoid arthritis       SOCIAL HISTORY  Social History     Tobacco Use    Smoking status: Former     Packs/day: 1.00     Years: 25.00     Pack years: 25.00     Types: Cigarettes     Quit date: 1980     Years since quittin.2    Smokeless tobacco: Never   Substance and Sexual Activity    Alcohol use: Yes     Comment: 2 per week    Drug use: Yes     Comment: Occassional marijuana edible    Sexual activity: Yes     Comment: , 1 child, teacher       CURRENT MEDICATIONS  Home Medications       Reviewed by Itzel Jose R.N. (Registered Nurse) on 23 at 0728  Med List Status: Not Addressed     Medication Last Dose Status   apixaban (ELIQUIS) 5mg Tab  Active   apixaban (ELIQUIS) 5mg Tab  Active                    ALLERGIES  No Known Allergies    PHYSICAL EXAM  VITAL SIGNS: BP (!) 142/77   Pulse 81   Temp 37.1 °C (98.7 °F) (Temporal)   Resp 19   Ht 1.676 m (5' 6\")   Wt 78 kg (172 lb)   SpO2 96%   BMI 27.76 kg/m²      Pulse ox interpretation: I interpret this pulse ox as normal.  Constitutional: Alert in no apparent distress.  HENT: No signs of trauma, Bilateral external " ears normal, Nose normal.   Eyes: Pupils are equal and reactive  Neck: Normal range of motion, No tenderness, Supple  Cardiovascular: Regular rate and rhythm, no murmurs.   Thorax & Lungs: Normal breath sounds, No respiratory distress, No wheezing, No chest tenderness.   Abdomen: Bowel sounds normal, Soft, No tenderness  Skin: Warm, Dry, No erythema, No rash.   Extremities: Intact distal pulses  Musculoskeletal: Good range of motion in all major joints.  Right lower extremity: Largely without any significant dissymmetry on observation to compared to left.  The left knee does have postoperative scar.  The right thigh and calf are largely nontender.  Patient does have some tenderness to the medial malleolus and mid arch on the plantar surface of the foot.  There is no significant edema.  No evidence of cerulea dolens.  2+ DPP.  Neurologic: Alert , Normal motor function, Normal sensory function, No focal deficits noted.   Psychiatric: Affect normal, Judgment normal, Mood normal.         DIAGNOSTIC STUDIES / PROCEDURES      LABS  Results for orders placed or performed during the hospital encounter of 03/22/23   CBC WITH DIFFERENTIAL   Result Value Ref Range    WBC 6.7 4.8 - 10.8 K/uL    RBC 4.20 4.20 - 5.40 M/uL    Hemoglobin 12.4 12.0 - 16.0 g/dL    Hematocrit 36.8 (L) 37.0 - 47.0 %    MCV 87.6 81.4 - 97.8 fL    MCH 29.5 27.0 - 33.0 pg    MCHC 33.7 33.6 - 35.0 g/dL    RDW 39.1 35.9 - 50.0 fL    Platelet Count 292 164 - 446 K/uL    MPV 10.3 9.0 - 12.9 fL    Neutrophils-Polys 71.50 44.00 - 72.00 %    Lymphocytes 17.60 (L) 22.00 - 41.00 %    Monocytes 8.90 0.00 - 13.40 %    Eosinophils 1.30 0.00 - 6.90 %    Basophils 0.30 0.00 - 1.80 %    Immature Granulocytes 0.40 0.00 - 0.90 %    Nucleated RBC 0.00 /100 WBC    Neutrophils (Absolute) 4.79 2.00 - 7.15 K/uL    Lymphs (Absolute) 1.18 1.00 - 4.80 K/uL    Monos (Absolute) 0.60 0.00 - 0.85 K/uL    Eos (Absolute) 0.09 0.00 - 0.51 K/uL    Baso (Absolute) 0.02 0.00 - 0.12 K/uL     Immature Granulocytes (abs) 0.03 0.00 - 0.11 K/uL    NRBC (Absolute) 0.00 K/uL   COMP METABOLIC PANEL   Result Value Ref Range    Sodium 134 (L) 135 - 145 mmol/L    Potassium 3.9 3.6 - 5.5 mmol/L    Chloride 100 96 - 112 mmol/L    Co2 22 20 - 33 mmol/L    Anion Gap 12.0 7.0 - 16.0    Glucose 100 (H) 65 - 99 mg/dL    Bun 28 (H) 8 - 22 mg/dL    Creatinine 0.78 0.50 - 1.40 mg/dL    Calcium 9.1 8.5 - 10.5 mg/dL    AST(SGOT) 17 12 - 45 U/L    ALT(SGPT) 12 2 - 50 U/L    Alkaline Phosphatase 77 30 - 99 U/L    Total Bilirubin 0.6 0.1 - 1.5 mg/dL    Albumin 3.9 3.2 - 4.9 g/dL    Total Protein 7.4 6.0 - 8.2 g/dL    Globulin 3.5 1.9 - 3.5 g/dL    A-G Ratio 1.1 g/dL   PROTHROMBIN TIME   Result Value Ref Range    PT 18.8 (H) 12.0 - 14.6 sec    INR 1.61 (H) 0.87 - 1.13   APTT   Result Value Ref Range    APTT 30.2 24.7 - 36.0 sec   ESTIMATED GFR   Result Value Ref Range    GFR (CKD-EPI) 82 >60 mL/min/1.73 m 2   CORRECTED CALCIUM   Result Value Ref Range    Correct Calcium 9.2 8.5 - 10.5 mg/dL         RADIOLOGY  Deferred    In prescribing controlled substances to this patient, I certify that I have obtained and reviewed the medical history of Zora Mcneil. I have also made a good sera effort to obtain applicable records from other providers who have treated the patient and records did not demonstrate any increased risk of substance abuse that would prevent me from prescribing controlled substances.     I have conducted a physical exam and documented it. I have reviewed Ms. Mcneil’s prescription history as maintained by the [place] Prescription Monitoring Program.     I have assessed the patient’s risk for abuse, dependency, and addiction using the validated Opioid Risk Tool available at https://www.mdcalc.com/forqwk-icct-mocs-ort-narcotic-abuse.     Given the above, I believe the benefits of controlled substance therapy outweigh the risks. The reasons for prescribing controlled substances include non-narcotic, oral  analgesic alternatives have been inadequate for pain control. Accordingly, I have discussed the risk and benefits, treatment plan, and alternative therapies with the patient.     COURSE & MEDICAL DECISION MAKING    ED Observation Status? No; Patient does not meet criteria for ED Observation.     INITIAL ASSESSMENT, COURSE AND PLAN  Care Narrative: 67-year-old female presented [facility] apartment with worsening right lower extremity pain with known DVTs.  Patient stated that she is coming back as per return precautions.  Exam currently is quite benign.        DISPOSITION AND DISCUSSIONS  I have discussed management of the patient with the following physicians and CHAS's: Discussed case with radiologist, vascular surgeon Dr. Rincon and pharmacy staff with regards to anticoagulation options    Discussion of management with other [facility] or appropriate source(s): Pharmacy as above      Escalation of care considered, and ultimately not performed:diagnostic imaging and acute inpatient care management, however at this time, the patient is most appropriate for outpatient management    Barriers to care at this time, including but not limited to:  None .     67-year-old female presenting to the [facility] part with the above presentation.  She was seen 48 hours ago with similar presentation.  Work-up was completed then and reviewed by myself.  With today's representation and relatively benign exam I have consulted the above providers to include radiologist discussed imaging options, vascular surgeon to discuss further inpatient versus outpatient options with her known DVT as reviewed on most recent ultrasound as well as pharmacy staff for anticoagulation options.    With the above conversations the radiology states that CT venogram with likely not be helpful especially given the more distal pathology is identified on most recent ultrasound.  Dr. Rincon with vascular surgery also states that given the small distal pathology she would  not be a candidate for other procedural interventions and believes that outpatient clinic follow-up would be most appropriate.  Lastly from a pharmacy conversation we have discussed anticoagulation options to include her previously used Xarelto, her current Eliquis and potentially even use of Lovenox.    At this point the patient is understanding of all the above conversations.  With regards to her anticoagulation through shared decision making we have elected to continue on the Eliquis as she is just nearing the end of her current bolus dosing.  It would be suboptimal to start Lovenox shots and then have to transition yet to another oral medication.    At this point as was discussed with Dr. Rincon I will escalate pain management to include narcotics as well as NSAIDs.  Again she is understanding of need to follow-up with PCP as well as vascular surgery.  Lastly she and her  at bedside are understanding return precautions should she have any changes or worsening.    FINAL DIAGNOSIS  1. Deep vein thrombosis (DVT) of proximal vein of right lower extremity, unspecified chronicity (HCC)    2. Pain of right lower extremity    3. Elevated BUN           Electronically signed by: Luis Antonio Gibbons M.D., 3/22/2023 8:24 AM

## 2023-03-22 NOTE — ED NOTES
Patient discharged home with . Crutches given to pt. AVS reviewed and understood, all questions answered.

## 2023-03-22 NOTE — ED TRIAGE NOTES
"Chief Complaint   Patient presents with    Leg Pain     Right leg - known DVT in leg - severe pain     Leg Swelling     Swelling above left knee - knee surgery in October      Pt to triage for right leg pain and right knee swelling. Pt has known DVT to right leg, complaints with Eliquis. Pt was seen here on Monday and discharged home. Pt states pain is getting worse and she is unable to ambulate.     Pt is alert and oriented, speaking in full sentences, follows commands and responds appropriately to questions.      Pt placed in lobby. Pt educated on triage process and apologized for wait time. Pt encouraged to alert staff for any changes.     Patient and staff wearing appropriate PPE      /73   Pulse 82   Temp 36.8 °C (98.2 °F) (Temporal)   Resp 18   Ht 1.676 m (5' 6\")   Wt 78 kg (172 lb)   SpO2 99%       "

## 2023-03-22 NOTE — ED NOTES
PT was wheeled back in a wheelchair, and she was able to transfer herself from the chair to the bed. Legs exposed and she was hooked up to the monitor.

## 2023-03-28 ENCOUNTER — ANTICOAGULATION VISIT (OUTPATIENT)
Dept: VASCULAR LAB | Facility: MEDICAL CENTER | Age: 68
End: 2023-03-28
Attending: INTERNAL MEDICINE
Payer: MEDICARE

## 2023-03-28 DIAGNOSIS — I26.99 PULMONARY EMBOLISM, BILATERAL (HCC): ICD-10-CM

## 2023-03-28 PROCEDURE — 99213 OFFICE O/P EST LOW 20 MIN: CPT

## 2023-03-28 NOTE — PROGRESS NOTES
NEW DOAC   .  Anticoagulation Summary  As of 3/28/2023      INR goal:     TTR:  --   INR used for dosing:  No new INR was available at the time of this encounter.   Warfarin maintenance plan:  No maintenance plan   Next INR check:     Target end date:  Indefinite    Indications    Left leg DVT (HCC) [I82.402]  Pulmonary embolism  bilateral (HCC) [I26.99]  Right leg DVT (HCC) [I82.401]                 Anticoagulation Episode Summary       INR check location:      Preferred lab:      Send INR reminders to:      Comments:            Anticoagulation Patient Findings      PCP: Saturnino Walton M.D.  Cardiologist: None  Dx: RLE DVT & PE  CHADSVASC = N/a  HAS-BLED = N/a  Target End Date = Indefinite    Pt Hx: Pt presented to the ED on 3/16/23 d/t RLE pain/soreness & dyspnea and diaphoreses. Upon further workup, pt was found to have RLE DVT and bilateral PE. Pt subsequently admitted and started on therapeutic anticoagulation.     Pt w/ hx of DVT in her LLE (~9 yrs ago) that was deemed to be provoked d/t vein procedure - completed 1 yr of OAC then Dc'd anticoagulation (Xarelto). Pt then developed new RLE DVT ~ 2 yrs ago - appears potentially unprovoked and completed 3 months of OAC (Xarelto).     Most recently pt had LTKA in 10/2022. She states she is not sedentary. Denies Fhx of VTE.    Labs:  Lab Results   Component Value Date/Time    WBC 6.7 03/22/2023 09:03 AM    WBC 6.0 12/07/2010 07:30 AM    RBC 4.20 03/22/2023 09:03 AM    RBC 4.29 12/07/2010 07:30 AM    HEMOGLOBIN 12.4 03/22/2023 09:03 AM    HEMATOCRIT 36.8 (L) 03/22/2023 09:03 AM    MCV 87.6 03/22/2023 09:03 AM    MCV 93 12/07/2010 07:30 AM    MCH 29.5 03/22/2023 09:03 AM    MCH 30.1 12/07/2010 07:30 AM    MCHC 33.7 03/22/2023 09:03 AM    MPV 10.3 03/22/2023 09:03 AM    NEUTSPOLYS 71.50 03/22/2023 09:03 AM    LYMPHOCYTES 17.60 (L) 03/22/2023 09:03 AM    MONOCYTES 8.90 03/22/2023 09:03 AM    EOSINOPHILS 1.30 03/22/2023 09:03 AM    BASOPHILS 0.30 03/22/2023 09:03 AM     HYPOCHROMIA 1+ 04/18/2014 09:25 PM      Lab Results   Component Value Date/Time    SODIUM 134 (L) 03/22/2023 09:03 AM    POTASSIUM 3.9 03/22/2023 09:03 AM    CHLORIDE 100 03/22/2023 09:03 AM    CO2 22 03/22/2023 09:03 AM    GLUCOSE 100 (H) 03/22/2023 09:03 AM    BUN 28 (H) 03/22/2023 09:03 AM    CREATININE 0.78 03/22/2023 09:03 AM    CREATININE 0.81 12/07/2010 07:30 AM    BUNCREATRAT 23 12/07/2010 07:30 AM    GLOMRATE >59 12/07/2010 07:30 AM          Pt is new to Eliquis and new to [facility]. Discussed:   Indication for DOAC therapy.  Importance of monitoring and compliance.   Monitoring parameters, signs and symptoms of bleeding or clotting.  DOAC therapy, side effects, potential DDIs, OTC medications  Hormonal therapy - None  Pregnancy - N/a  DDI - Pt not on any other meds  Pt is NOT on antiplatelet/NSAID therapy.   Lifestyle safety, ie smoking, ETOH, hobby safety, fall safety/prevention  Procedures for missed doses or suspected missed doses, surgeries/procedures, travel, dental work, any medication changes    Pt started with Eliquis 10mg taken 2 times a day for 1 week and then decreased to 5mg taken 2 times daily thereafter. Pt transitioned to 5 mg BID dose on 3/24/23    DOAC affordable = Yes    Samples provided: No    Labs to be completed prior to next f/u - CBC, CMP    F/U - 3 months    Marcos Melo, PharmD, BCACP      Added [facility] to care team   CC: Dr. Bloch

## 2023-03-29 ENCOUNTER — DOCUMENTATION (OUTPATIENT)
Dept: PHARMACY | Facility: MEDICAL CENTER | Age: 68
End: 2023-03-29
Payer: MEDICARE

## 2023-03-29 NOTE — DOCUMENTATION QUERY
[facility]                                                                       Query Response Note      PATIENT:               NILSON PEREZ  ACCT #:                  [id number]  MRN:                     [id number]  :                      1955  ADMIT DATE:       3/20/2023 10:01 AM  DISCH DATE:        3/20/2023 1:29 PM  RESPONDING  PROVIDER #:        [id number]           QUERY TEXT:    Your assistance is needed in determining the reason  for NATRIURETIC PEPTIDE (BNP) LAB, EKG that was ordered.  This service is non-covered by the diagnoses documented in the medical record.      Can you please provide an additional diagnosis that describes the sign or symptom that  (prompted/initiated) the NATRIURETIC PEPTIDE (BNP) LAB, EKG.    NOTE:  If an appropriate answer is not listed below, please respond with a new note.        The patient's Clinical Indicators include:  NATRIURETIC PEPTIDE (BNP) LAB, EKG  Options provided:   -- Other related diagnosis, Please specify diagnosis demonstrating medical necessity for lab/imaging/other test.)   -- Unable to determine      Query created by: Beba Hutchinson on 3/28/2023 6:58 AM    RESPONSE TEXT:    Unable to determine          Electronically signed by:  DEEPALI LEVI MD 3/29/2023 4:38 PM

## 2023-03-30 NOTE — PROGRESS NOTES
Drug: Eliquis 5mg tablets   Status: NO PA Needed  Copay: $440 per 60 tabs per 30 days   Fill with [facility]: Yes    Will outreach to offer services and/or release to preferred pharmacy

## 2023-04-03 ENCOUNTER — DOCUMENTATION (OUTPATIENT)
Dept: VASCULAR LAB | Facility: MEDICAL CENTER | Age: 68
End: 2023-04-03
Payer: MEDICARE

## 2023-04-03 NOTE — PROGRESS NOTES
Initial anticoag note and most recent d/c summary reviewed.     Pending further recs from pcp, we will continue with indefinite oac with apixaban for recurrent vte.     Will defer any indicated age appropriate screening for occult malignancy to pcp.    Michael Bloch, MD  Anticoagulation Clinic    Cc:  NEREYDA Walton

## 2023-04-13 ASSESSMENT — ENCOUNTER SYMPTOMS
DYSPNEA AT REST: 1
HEMOPTYSIS: 0
SHORTNESS OF BREATH: 1
WHEEZING: 0
CHEST TIGHTNESS: 1

## 2023-04-14 ENCOUNTER — OFFICE VISIT (OUTPATIENT)
Dept: SLEEP MEDICINE | Facility: MEDICAL CENTER | Age: 68
End: 2023-04-14
Attending: INTERNAL MEDICINE
Payer: MEDICARE

## 2023-04-14 VITALS
HEIGHT: 66 IN | WEIGHT: 169 LBS | OXYGEN SATURATION: 99 % | DIASTOLIC BLOOD PRESSURE: 78 MMHG | BODY MASS INDEX: 27.16 KG/M2 | HEART RATE: 79 BPM | SYSTOLIC BLOOD PRESSURE: 122 MMHG

## 2023-04-14 DIAGNOSIS — R91.8 PULMONARY NODULES: ICD-10-CM

## 2023-04-14 DIAGNOSIS — R05.3 CHRONIC COUGH: ICD-10-CM

## 2023-04-14 DIAGNOSIS — I26.99 PULMONARY EMBOLISM, BILATERAL (HCC): ICD-10-CM

## 2023-04-14 PROCEDURE — 99204 OFFICE O/P NEW MOD 45 MIN: CPT | Performed by: INTERNAL MEDICINE

## 2023-04-14 PROCEDURE — 99212 OFFICE O/P EST SF 10 MIN: CPT | Performed by: INTERNAL MEDICINE

## 2023-04-14 RX ORDER — ALBUTEROL SULFATE 90 UG/1
2 AEROSOL, METERED RESPIRATORY (INHALATION) EVERY 6 HOURS PRN
Qty: 8.5 G | Refills: 11 | Status: SHIPPED | OUTPATIENT
Start: 2023-04-14 | End: 2023-12-19

## 2023-04-14 ASSESSMENT — ENCOUNTER SYMPTOMS
SHORTNESS OF BREATH: 1
HEMOPTYSIS: 0
WHEEZING: 0

## 2023-04-14 ASSESSMENT — FIBROSIS 4 INDEX: FIB4 SCORE: 1.16

## 2023-04-14 NOTE — PROGRESS NOTES
Pulmonary Consultation    Date of Service: 4/14/2023    Consulting Physician: Saturnino Walton M.D.    Reason for consult:  Hospital Follow-up ([facility] 3/16/23-3/18/23 for Pulmonary Embolism ) and Results (DX-Chest 3/20/23)      Problem List Items Addressed This Visit       Pulmonary embolism, bilateral (HCC)     Unprovoked from DVT RLL  This is her second unprovoked DVT  Reviewed with pt and that she had > 20% lifetime risk of reoccurance if not on anticoagulation  Thus, recommend lifetime anticoagulation  Reviewed the side effects of anticoagulation particularly bleeding         Chronic cough     Started around 2 years ago  Wheeze on exam  Suspect adult onset asthma  Reviewed with pt  Trial of asmanex  PFTs to assess  Reviewed environmental triggers of asthma and some behavioral changes that would decrease the symptoms  Reviewed inhaler teaching         Relevant Medications    albuterol 108 (90 Base) MCG/ACT Aero Soln inhalation aerosol    Mometasone Furoate 100 MCG/ACT Aerosol    Other Relevant Orders    PULMONARY FUNCTION TESTS -Test requested: Complete Pulmonary Function Test    Pulmonary nodules     B/l GG seen on CT PE on 3/16  Likely inflammatory but does need a 3 month follow up of CT to ensure resolving         Relevant Orders    CT-CHEST (THORAX) W/O         History of Present Illness: Zora Mcneil is a 67 y.o. female with a past medical history of DVTs and now PE.  Here post hospitalizations  Other hx OA, hyperlipidemia, depression, carpel tunnel    Pulm symptoms a couple of years ago with SOB and unable to hike as week as she gets more SOB. Worse with the season change. Lived in [facility] area for a long time. Cough with grey sputum. No hemoptysis    Her DVT hx is quite extensive    10 yrs ago PE on right leg post knee surgery  Right le clot 2 years unprovoked     Now DVT on LE and PE b/l segmental and subsegment  Admitted 3/16/2023 for PE  Started on eliquis  She also has areas of nodular patchy  gg opacities    Pulmonary History Questionnaire (Submitted on 4/13/2023)  What is the reason for your visit today?: DVT and PE  Bring up phlegm (mucus, sputum) in the morning or other times during the day?: Yes  If so, how long have you produced phlegm (Months, Years), and what is the usual color of your phlegm?: Year  Do you experience any chest tightness?: Yes  How often?: constant  Experience shortness of breath at rest?: Yes  Have you ever been hospitalized?: Yes  Reason, year, and hospital in which you were hospitalized:: DVT. PE  Have you ever needed to be intubated or placed on a ventilator? : No  Have you ever had problems with anesthesia?: No  Have you experienced post-operative delirium?: No  What year did you receive your last Flu shot?: 2022  What year did you receive you last Pneumonia shot?: 2023  Have you had a TB skin test? If so, please list the year and result:: No  Please list all your occupations from your first job to your current job. Include Industry/Company, location, year, and your specific job.: 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 Negative for hemoptysis and wheezing.      Current Outpatient Medications on File Prior to Visit   Medication Sig Dispense Refill    apixaban (ELIQUIS) 5mg Tab Take 1 Tablet by mouth 2 times a day. 60 Tablet 5     No current facility-administered medications on file prior to visit.       Social History     Tobacco Use    Smoking status: Former     Packs/day: 1.00     Years: 25.00     Pack years: 25.00     Types: Cigarettes  "    Quit date: 1980     Years since quittin.3    Smokeless tobacco: Never   Vaping Use    Vaping Use: Never used   Substance Use Topics    Alcohol use: Yes     Comment: 2 per week    Drug use: Yes     Comment: Occassional marijuana edible        Past Medical History:   Diagnosis Date    Arthritis     fingers    Blood clotting disorder (HCC)     xarelto x 1 year, left leg    Bowel habit changes     Bronchitis     Chest tightness     Cold 2018    \"Minor cold one week ago\" Denies productive cough, SOB    Cough     Dental disorder     upper frontal bridge    Hyperlipidemia     Obesity     Pain 2018    left knee, back    Psychiatric problem     depression    Shortness of breath     Sputum production     Unspecified urinary incontinence     stress       Past Surgical History:   Procedure Laterality Date    PB TOTAL KNEE ARTHROPLASTY Left 10/13/2022    Procedure: LEFT TOTAL KNEE ARTHROPLASTY;  Surgeon: Rick Portillo M.D.;  Location: [facility] [place];  Service: Orthopedics    KNEE ARTHROSCOPY Left 2018    Procedure: KNEE ARTHROSCOPY;  Surgeon: Tom Moore M.D.;  Location: SURGERY [facility];  Service: Orthopedics    MENISCECTOMY, KNEE, MEDIAL Left 2018    Procedure: MEDIAL MENISCECTOMY - PARTIAL;  Surgeon: Tom Moore M.D.;  Location: [facility];  Service: Orthopedics    CARPAL TUNNEL ENDOSCOPIC Right 5/3/2017    Procedure: CARPAL TUNNEL ENDOSCOPIC;  Surgeon: Iván Edge M.D.;  Location: [facility];  Service:     LESION EXCISION ORTHO  2014    Performed by Fermín Edge M.D. at SURGERY [facility]    BONE SPUR EXCISION  2011    Performed by REGINE PERERA at SURGERY [facility]    TOE ARTHROPLASTY  2011    Performed by REGINE PERERA at SURGERY [facility]    COLONOSCOPY  2009,recheck 10    HEMORRHOIDECTOMY      KNEE ARTHROSCOPY      OTHER ORTHOPEDIC SURGERY   " "   toe joint surgery       Allergies: Patient has no known allergies.    Family History   Problem Relation Age of Onset    Cancer Father         lung    Arthritis Mother         rheumatoid arthritis       Vitals:    04/14/23 0912   Height: 1.676 m (5' 6\")   Weight: 76.7 kg (169 lb)   Weight % change since last entry.: 0 %   BP: 122/78   Pulse: 79   BMI (Calculated): 27.28       Physical Examination  Physical Exam  Constitutional:       Appearance: She is normal weight.   HENT:      Head: Normocephalic and atraumatic.      Mouth/Throat:      Mouth: Mucous membranes are moist.   Eyes:      Extraocular Movements: Extraocular movements intact.      Pupils: Pupils are equal, round, and reactive to light.   Cardiovascular:      Rate and Rhythm: Normal rate.   Pulmonary:      Effort: Pulmonary effort is normal.      Breath sounds: Wheezing present.   Musculoskeletal:         General: Normal range of motion.   Skin:     General: Skin is warm and dry.   Neurological:      General: No focal deficit present.      Mental Status: She is oriented to person, place, and time.   Psychiatric:         Mood and Affect: Mood normal.         Behavior: Behavior normal.         Thought Content: Thought content normal.         Judgment: Judgment normal.             Zully Pike M.D., MD MPH GIRMA  [facility]  "

## 2023-04-16 PROBLEM — R91.8 PULMONARY NODULES: Status: ACTIVE | Noted: 2023-04-16

## 2023-04-16 PROBLEM — R05.3 CHRONIC COUGH: Status: ACTIVE | Noted: 2023-04-16

## 2023-04-16 NOTE — ASSESSMENT & PLAN NOTE
B/l GG seen on CT PE on 3/16  Likely inflammatory but does need a 3 month follow up of CT to ensure resolving

## 2023-04-16 NOTE — ASSESSMENT & PLAN NOTE
Unprovoked from DVT RLL  This is her second unprovoked DVT  Reviewed with pt and that she had > 20% lifetime risk of reoccurance if not on anticoagulation  Thus, recommend lifetime anticoagulation  Reviewed the side effects of anticoagulation particularly bleeding

## 2023-04-16 NOTE — ASSESSMENT & PLAN NOTE
Started around 2 years ago  Wheeze on exam  Suspect adult onset asthma  Reviewed with pt  Trial of asmanex  PFTs to assess  Reviewed environmental triggers of asthma and some behavioral changes that would decrease the symptoms  Reviewed inhaler teaching

## 2023-05-12 ENCOUNTER — NON-PROVIDER VISIT (OUTPATIENT)
Dept: SLEEP MEDICINE | Facility: MEDICAL CENTER | Age: 68
End: 2023-05-12
Attending: INTERNAL MEDICINE
Payer: MEDICARE

## 2023-05-12 VITALS — WEIGHT: 180.6 LBS | HEIGHT: 66 IN | BODY MASS INDEX: 29.02 KG/M2

## 2023-05-12 DIAGNOSIS — R05.3 CHRONIC COUGH: ICD-10-CM

## 2023-05-12 PROCEDURE — 94729 DIFFUSING CAPACITY: CPT | Performed by: INTERNAL MEDICINE

## 2023-05-12 PROCEDURE — 94729 DIFFUSING CAPACITY: CPT | Mod: 26 | Performed by: INTERNAL MEDICINE

## 2023-05-12 PROCEDURE — 94726 PLETHYSMOGRAPHY LUNG VOLUMES: CPT | Performed by: INTERNAL MEDICINE

## 2023-05-12 PROCEDURE — 94726 PLETHYSMOGRAPHY LUNG VOLUMES: CPT | Mod: 26 | Performed by: INTERNAL MEDICINE

## 2023-05-12 PROCEDURE — 94060 EVALUATION OF WHEEZING: CPT | Performed by: INTERNAL MEDICINE

## 2023-05-12 PROCEDURE — 94060 EVALUATION OF WHEEZING: CPT | Mod: 26 | Performed by: INTERNAL MEDICINE

## 2023-05-12 ASSESSMENT — PULMONARY FUNCTION TESTS
FEV1_PERCENT_CHANGE: 0
FEV1/FVC_PERCENT_CHANGE: 0
FEV1/FVC_PERCENT_PREDICTED: 97
FEV1/FVC_PERCENT_LLN: 65
FEV1/FVC_PERCENT_PREDICTED: 105
FVC_PREDICTED: 3.09
FEV1/FVC: 82.26
FEV1_PREDICTED: 2.39
FEV1/FVC_PERCENT_PREDICTED: 96
FVC_PERCENT_PREDICTED: 114
FEV1/FVC: 76
FEV1: 2.69
FVC: 3.53
FEV1/FVC_PERCENT_CHANGE: 8
FEV1_LLN: 2.00
FEV1/FVC: 82
FEV1_PERCENT_CHANGE: -7
FEV1_PERCENT_PREDICTED: 112
FVC_PERCENT_PREDICTED: 105
FEV1/FVC_PERCENT_PREDICTED: 107
FEV1/FVC: 76
FVC: 3.27
FVC_LLN: 2.58
FEV1/FVC_PREDICTED: 78
FEV1: 2.67
FEV1/FVC_PERCENT_PREDICTED: 77
FEV1_PERCENT_PREDICTED: 111

## 2023-05-12 ASSESSMENT — FIBROSIS 4 INDEX: FIB4 SCORE: 1.16

## 2023-05-12 NOTE — PROCEDURES
Tech: Christelle Jane, RT  Good patient effort & cooperation.  Test was performed on the [employer] Body Plethysmograph- Elite DX system.  The predicted sets used for Spirometry are GLI-2012, for Lung Volumes are ITS, and for DLCO is GLI 2017.  The results of this test meet the ATS standards for acceptability and repeatability.  The DLCO was uncorrected for Hb.  A bronchodilator of Ventolin HFA 2 puffs via spacer was administered.  DLCO was performed during dilation period.    Interpretation:   There is no significant obstructive ventilatory defect on spirometry.  There is no significant response to bronchodilators.    Lung volumes are normal.    Diffusion capacity is preserved.    Flow volume loop is consistent with the above interpretation.    No prior PFTs for comparison.    I, Alfonso De Souza M.D. am the author of this note (PFT interpretation).    __________  Alfonso De Souza MD  Pulmonary and Critical Care Medicine  [facility]

## 2023-05-26 ENCOUNTER — HOSPITAL ENCOUNTER (OUTPATIENT)
Dept: LAB | Facility: MEDICAL CENTER | Age: 68
End: 2023-05-26
Attending: NURSE PRACTITIONER
Payer: MEDICARE

## 2023-05-26 DIAGNOSIS — I26.99 PULMONARY EMBOLISM, BILATERAL (HCC): ICD-10-CM

## 2023-05-26 LAB
ALBUMIN SERPL BCP-MCNC: 4.3 G/DL (ref 3.2–4.9)
ALBUMIN/GLOB SERPL: 1.4 G/DL
ALP SERPL-CCNC: 77 U/L (ref 30–99)
ALT SERPL-CCNC: 11 U/L (ref 2–50)
ANION GAP SERPL CALC-SCNC: 10 MMOL/L (ref 7–16)
AST SERPL-CCNC: 13 U/L (ref 12–45)
BASOPHILS # BLD AUTO: 0.3 % (ref 0–1.8)
BASOPHILS # BLD: 0.01 K/UL (ref 0–0.12)
BILIRUB SERPL-MCNC: 0.6 MG/DL (ref 0.1–1.5)
BUN SERPL-MCNC: 27 MG/DL (ref 8–22)
CALCIUM ALBUM COR SERPL-MCNC: 9.4 MG/DL (ref 8.5–10.5)
CALCIUM SERPL-MCNC: 9.6 MG/DL (ref 8.5–10.5)
CHLORIDE SERPL-SCNC: 105 MMOL/L (ref 96–112)
CO2 SERPL-SCNC: 25 MMOL/L (ref 20–33)
CREAT SERPL-MCNC: 0.75 MG/DL (ref 0.5–1.4)
EOSINOPHIL # BLD AUTO: 0.1 K/UL (ref 0–0.51)
EOSINOPHIL NFR BLD: 2.6 % (ref 0–6.9)
ERYTHROCYTE [DISTWIDTH] IN BLOOD BY AUTOMATED COUNT: 46.2 FL (ref 35.9–50)
GFR SERPLBLD CREATININE-BSD FMLA CKD-EPI: 86 ML/MIN/1.73 M 2
GLOBULIN SER CALC-MCNC: 3 G/DL (ref 1.9–3.5)
GLUCOSE SERPL-MCNC: 92 MG/DL (ref 65–99)
HCT VFR BLD AUTO: 39.1 % (ref 37–47)
HGB BLD-MCNC: 12.8 G/DL (ref 12–16)
IMM GRANULOCYTES # BLD AUTO: 0.01 K/UL (ref 0–0.11)
IMM GRANULOCYTES NFR BLD AUTO: 0.3 % (ref 0–0.9)
LYMPHOCYTES # BLD AUTO: 1.3 K/UL (ref 1–4.8)
LYMPHOCYTES NFR BLD: 33.3 % (ref 22–41)
MCH RBC QN AUTO: 29.8 PG (ref 27–33)
MCHC RBC AUTO-ENTMCNC: 32.7 G/DL (ref 32.2–35.5)
MCV RBC AUTO: 91.1 FL (ref 81.4–97.8)
MONOCYTES # BLD AUTO: 0.35 K/UL (ref 0–0.85)
MONOCYTES NFR BLD AUTO: 9 % (ref 0–13.4)
NEUTROPHILS # BLD AUTO: 2.13 K/UL (ref 1.82–7.42)
NEUTROPHILS NFR BLD: 54.5 % (ref 44–72)
NRBC # BLD AUTO: 0 K/UL
NRBC BLD-RTO: 0 /100 WBC (ref 0–0.2)
PLATELET # BLD AUTO: 244 K/UL (ref 164–446)
PMV BLD AUTO: 11 FL (ref 9–12.9)
POTASSIUM SERPL-SCNC: 4.4 MMOL/L (ref 3.6–5.5)
PROT SERPL-MCNC: 7.3 G/DL (ref 6–8.2)
RBC # BLD AUTO: 4.29 M/UL (ref 4.2–5.4)
SODIUM SERPL-SCNC: 140 MMOL/L (ref 135–145)
WBC # BLD AUTO: 3.9 K/UL (ref 4.8–10.8)

## 2023-05-26 PROCEDURE — 85025 COMPLETE CBC W/AUTO DIFF WBC: CPT

## 2023-05-26 PROCEDURE — 80053 COMPREHEN METABOLIC PANEL: CPT

## 2023-05-26 PROCEDURE — 36415 COLL VENOUS BLD VENIPUNCTURE: CPT

## 2023-05-31 ENCOUNTER — OFFICE VISIT (OUTPATIENT)
Dept: SLEEP MEDICINE | Facility: MEDICAL CENTER | Age: 68
End: 2023-05-31
Attending: NURSE PRACTITIONER
Payer: MEDICARE

## 2023-05-31 VITALS
HEIGHT: 66 IN | BODY MASS INDEX: 28.37 KG/M2 | RESPIRATION RATE: 16 BRPM | DIASTOLIC BLOOD PRESSURE: 60 MMHG | OXYGEN SATURATION: 96 % | WEIGHT: 176.5 LBS | HEART RATE: 65 BPM | SYSTOLIC BLOOD PRESSURE: 108 MMHG

## 2023-05-31 DIAGNOSIS — R91.8 PULMONARY NODULES: ICD-10-CM

## 2023-05-31 DIAGNOSIS — R05.3 CHRONIC COUGH: ICD-10-CM

## 2023-05-31 PROCEDURE — 3078F DIAST BP <80 MM HG: CPT | Performed by: NURSE PRACTITIONER

## 2023-05-31 PROCEDURE — 99214 OFFICE O/P EST MOD 30 MIN: CPT | Performed by: NURSE PRACTITIONER

## 2023-05-31 PROCEDURE — 3074F SYST BP LT 130 MM HG: CPT | Performed by: NURSE PRACTITIONER

## 2023-05-31 PROCEDURE — 99212 OFFICE O/P EST SF 10 MIN: CPT | Performed by: NURSE PRACTITIONER

## 2023-05-31 ASSESSMENT — FIBROSIS 4 INDEX: FIB4 SCORE: 1.11

## 2023-05-31 NOTE — PROGRESS NOTES
"No chief complaint on file.      HPI:  Zora Mcneil is a 67 y.o. year old female here today for follow-up on PFT results.  Last seen 4/14/2023 by Dr. Tang [facility] follow-up for pulmonary embolism.  She complained of a chronic cough x2 years with wheezing.  Patient also had history of unprovoked DVT in the right lower leg which was her second occurrence.  Was recommended that she start lifetime anticoagulation, Eliquis 5 mg.  She denied any untoward side effects from the medication specifically bleeding.    In regards to her breathing symptoms, patient was started on mometasone and albuterol as needed with order for updating baseline PFTs.  Patient did complain of cough and did have evidence of wheezing with auscultation at previous visit.  Patient was started on maintenance inhaler with rescue inhaler.  She states she used to medications sporadically throughout the past month.  She denies any increased shortness of breath or dyspnea with exertion and also denies any current cough, wheezing, chest tightness, or nighttime symptoms.  She generally uses the inhalers if she is active.  She does participate in hiking frequently.    PFT (5/12/2023):  Normal spirometry with no evidence of postbronchodilator change, normal lung volumes, and preserved DLCO.    ROS: As per HPI and otherwise negative if not stated.    Past Medical History:   Diagnosis Date    Arthritis     fingers    Blood clotting disorder (HCC) 2014    xarelto x 1 year, left leg    Bowel habit changes     Bronchitis     Chest tightness     Cold 04/18/2018    \"Minor cold one week ago\" Denies productive cough, SOB    Cough     Dental disorder     upper frontal bridge    Hyperlipidemia     Obesity     Pain 04/2018    left knee, back    Psychiatric problem     depression    Shortness of breath     Sputum production     Unspecified urinary incontinence     stress       Past Surgical History:   Procedure Laterality Date    PB TOTAL KNEE ARTHROPLASTY " Left 10/13/2022    Procedure: LEFT TOTAL KNEE ARTHROPLASTY;  Surgeon: Rick Portillo M.D.;  Location: [facility] [place];  Service: Orthopedics    KNEE ARTHROSCOPY Left 4/23/2018    Procedure: KNEE ARTHROSCOPY;  Surgeon: Tom Moore M.D.;  Location: SURGERY [facility];  Service: Orthopedics    MENISCECTOMY, KNEE, MEDIAL Left 4/23/2018    Procedure: MEDIAL MENISCECTOMY - PARTIAL;  Surgeon: Tom Moore M.D.;  Location: SURGERY [facility];  Service: Orthopedics    CARPAL TUNNEL ENDOSCOPIC Right 5/3/2017    Procedure: CARPAL TUNNEL ENDOSCOPIC;  Surgeon: Iván Edge M.D.;  Location: [facility];  Service:     LESION EXCISION ORTHO  1/27/2014    Performed by Fermín Edge M.D. at SURGERY SAME DAY [facility]    BONE SPUR EXCISION  11/11/2011    Performed by REGINE PERERA at SURGERY SAME DAY [facility]    TOE ARTHROPLASTY  11/11/2011    Performed by REGINE PERERA at SURGERY SAME DAY [facility]    COLONOSCOPY  2009,recheck 10    HEMORRHOIDECTOMY      KNEE ARTHROSCOPY      OTHER ORTHOPEDIC SURGERY      toe joint surgery       Family History   Problem Relation Age of Onset    Cancer Father         lung    Arthritis Mother         rheumatoid arthritis       Allergies as of 05/31/2023    (No Known Allergies)        Vitals:  There were no vitals taken for this visit.    Current medications as of today   Current Outpatient Medications   Medication Sig Dispense Refill    albuterol 108 (90 Base) MCG/ACT Aero Soln inhalation aerosol Inhale 2 Puffs every 6 hours as needed for Shortness of Breath. 8.5 g 11    Mometasone Furoate 100 MCG/ACT Aerosol Inhale 1 Puff 2 times a day. 1 Each 0    apixaban (ELIQUIS) 5mg Tab Take 1 Tablet by mouth 2 times a day. 60 Tablet 5     No current facility-administered medications for this visit.         Physical Exam:   Gen:           Alert and oriented, No apparent distress. Mood and affect appropriate, normal  interaction with examiner.  Eyes:          PERRL, EOM intact, sclere white, conjunctive moist.  Ears:          Not examined.   Hearing:     Grossly intact.  Nose:          Normal, no lesions or deformities.  Dentition:    Good dentition.  Oropharynx:   Tongue normal, posterior pharynx without erythema or exudate  Neck:        Supple, trachea midline, no masses.  Respiratory Effort: No intercostal retractions or use of accessory muscles.   Lung Auscultation:      Clear to auscultation bilaterally; no rales, rhonchi or wheezing.  CV:            Regular rate and rhythm. No murmurs, rubs or gallops.  Abd:           Not examined.   Lymphadenopathy: Not examined.  Gait and Station: Normal.  Digits and Nails: No clubbing, cyanosis, petechiae, or nodes.   Cranial Nerves: II-XII grossly intact.  Skin:        No rashes, lesions or ulcers noted.               Ext:           No cyanosis or edema.      Assessment:  1. Chronic cough        2. Pulmonary nodules            Plan:  PFTs were essentially normal.  Patient currently asymptomatic.  Likely cough is result of reactive airway disease or asthma.  Recommend continuing mometasone and albuterol as needed.  Discussed stepdown approach.  Patient able to use mometasone once daily instead of twice or only use albuterol as needed when symptoms are absent.  Refill provided for patient.  Patient will follow-up in 6 months, but can be seen sooner if needed.  CT scan showed evidence of groundglass opacity, recommended CT scan 3 months.  Patient not scheduled yet.  Recommended scheduling and completion will review results.    Please note that this dictation was created using voice recognition software. I have made every reasonable attempt to correct obvious errors, but it is possible there are errors of grammar and possibly content that I did not discover before finalizing the note.

## 2023-06-20 ENCOUNTER — ANTICOAGULATION VISIT (OUTPATIENT)
Dept: VASCULAR LAB | Facility: MEDICAL CENTER | Age: 68
End: 2023-06-20
Attending: INTERNAL MEDICINE
Payer: MEDICARE

## 2023-06-20 DIAGNOSIS — I82.401 ACUTE DEEP VEIN THROMBOSIS (DVT) OF RIGHT LOWER EXTREMITY, UNSPECIFIED VEIN (HCC): ICD-10-CM

## 2023-06-20 DIAGNOSIS — I26.99 PULMONARY EMBOLISM, BILATERAL (HCC): ICD-10-CM

## 2023-06-20 PROCEDURE — 99212 OFFICE O/P EST SF 10 MIN: CPT

## 2023-06-20 NOTE — PROGRESS NOTES
Target end date:indefinite  Indication: Recurrent VTE  Drug: Eliquis 5mg bid  CHADsVASC = n/a  HAS-BLED = 1 (age)    Health Status Since Last Assessment  Patient denies any new relevant medical problems, ED visits or hospitalizations  Patient denies any embolic events (stroke/tia/systemic embolism)    Adherence with DOAC Therapy  Pt has not missed any doses in the average week    Bleeding Risk Assessment  Denies Epistaxis  Pt denies any excessive or unusual bleeding/hematomas.  Pt denies any GI bleeds or hematemesis.  Pt denies any concerning daily headache or sub dural hematoma symptoms.    Pt denies any hematuria or abnormal vaginal bleeding.  Latest Hemoglobin wnl  Platelets: wnl  ETOH overuse none     Creatinine Clearance/Renal Function  Latest CrCl > 30ml/min    Hepatic function  Latest LFTs wnl  Pt denies any history of liver dysfunction      Drug Interactions  Medications reconciled   ASA/other antiplatelets no  NSAID no  Other drug interactions no   Verified no anticonvulsant or azole therapy, education provided for future use.     Examination  Blood Pressure   There were no vitals filed for this visit.  Symptomatic hypotension no  Significant gait impairment/imbalance/high risk for falls? no    Final Assessment and Recommendations:  Patient appears stable from the anticoagulation standpoint.    Patient is able to afford DOAC    Benefits of continued DOAC therapy outweigh risks for this patient  Recommend pt continue with current DOAC therapy     Pt will be leaving for [place] from Sept until end of Oct. She will contact us in Aug to send RX for 90 ds to cover her while she is on her trip     Other Actions: cmp/ cbc hemogram ordered prior to next visit    Follow up:  Will follow up with patient 6  months.      Laila Taveras, PharmD

## 2023-06-22 ENCOUNTER — HOSPITAL ENCOUNTER (OUTPATIENT)
Dept: RADIOLOGY | Facility: MEDICAL CENTER | Age: 68
End: 2023-06-22
Attending: INTERNAL MEDICINE
Payer: MEDICARE

## 2023-06-22 DIAGNOSIS — R91.8 PULMONARY NODULES: ICD-10-CM

## 2023-06-22 PROCEDURE — 71250 CT THORAX DX C-: CPT

## 2023-07-05 ENCOUNTER — OFFICE VISIT (OUTPATIENT)
Dept: SLEEP MEDICINE | Facility: MEDICAL CENTER | Age: 68
End: 2023-07-05
Attending: INTERNAL MEDICINE
Payer: MEDICARE

## 2023-07-05 VITALS
WEIGHT: 180 LBS | SYSTOLIC BLOOD PRESSURE: 124 MMHG | HEART RATE: 69 BPM | BODY MASS INDEX: 28.93 KG/M2 | HEIGHT: 66 IN | DIASTOLIC BLOOD PRESSURE: 74 MMHG | RESPIRATION RATE: 16 BRPM | OXYGEN SATURATION: 95 %

## 2023-07-05 DIAGNOSIS — R91.8 PULMONARY NODULES: ICD-10-CM

## 2023-07-05 DIAGNOSIS — E04.1 THYROID NODULE: ICD-10-CM

## 2023-07-05 DIAGNOSIS — I26.99 PULMONARY EMBOLISM, BILATERAL (HCC): ICD-10-CM

## 2023-07-05 PROCEDURE — 3078F DIAST BP <80 MM HG: CPT | Performed by: INTERNAL MEDICINE

## 2023-07-05 PROCEDURE — 99212 OFFICE O/P EST SF 10 MIN: CPT | Performed by: INTERNAL MEDICINE

## 2023-07-05 PROCEDURE — 3074F SYST BP LT 130 MM HG: CPT | Performed by: INTERNAL MEDICINE

## 2023-07-05 PROCEDURE — 99214 OFFICE O/P EST MOD 30 MIN: CPT | Performed by: INTERNAL MEDICINE

## 2023-07-05 ASSESSMENT — ENCOUNTER SYMPTOMS
CARDIOVASCULAR NEGATIVE: 1
PSYCHIATRIC NEGATIVE: 1
CONSTITUTIONAL NEGATIVE: 1
RESPIRATORY NEGATIVE: 1
NEUROLOGICAL NEGATIVE: 1
MUSCULOSKELETAL NEGATIVE: 1
GASTROINTESTINAL NEGATIVE: 1
EYES NEGATIVE: 1

## 2023-07-05 ASSESSMENT — FIBROSIS 4 INDEX: FIB4 SCORE: 1.11

## 2023-07-05 NOTE — PROGRESS NOTES
Pulmonary Clinic follow up    Date of Service: 7/5/2023    Reason for follow up:  Follow-Up (3 month fv /last seen on 5/31/2023 - Glenn Olivares APRN /CT done 6/22/2023)      Problem List Items Addressed This Visit       Pulmonary embolism, bilateral (HCC)     Unproved DVT RLL  Due to second unprovoked and PE on lifetime anticoagulation         Pulmonary nodules     Pt did have b/l pulm nodules seen on repeat CT 6/2023  HILTON is 9 mm and unchanged in size, rul looks smaller and lower lobe also looks smaller  Risk of malignancy based on [facility] calculator 19%  We reviewed GG nodules - could be benign or could be slow growing malignancy  Pt would like to think about whether to pursue bx or follow up with imaging  She will let us know bu end of the month         Thyroid nodule     1.8 cm on the right  Seen on CT chest  Pt not seeing her PCP for another 3 weeks  Ordered thyroid US         Relevant Orders    US-THYROID         History of Present Illness: Zora Mcneil is a 67 y.o. female with a past medical history of chronic cough, DVT and PE as well as OA, hyperlipidemia, depression and carpel tunnel.   I last saw patient on 4/14/2023 and CT scan showed GG nodules on 3/2023  This is a follow up appointment for persistent GG seen on CT  For cough assumed asthma and trialed asmanex and PFTS requested which were normal and symptoms have since improved  PE and DVT due to being unprovoked recommended lifetime anticoagulation  CT done 6/22/2023 shows the HILTON, RUL and RLL 9 mm GG nodules are unchanged in size           Review of Systems   Constitutional: Negative.    HENT: Negative.     Eyes: Negative.    Respiratory: Negative.     Cardiovascular: Negative.    Gastrointestinal: Negative.    Genitourinary: Negative.    Musculoskeletal: Negative.    Skin: Negative.    Neurological: Negative.    Endo/Heme/Allergies: Negative.    Psychiatric/Behavioral: Negative.         Current Outpatient Medications on File Prior to  "Visit   Medication Sig Dispense Refill    albuterol 108 (90 Base) MCG/ACT Aero Soln inhalation aerosol Inhale 2 Puffs every 6 hours as needed for Shortness of Breath. 8.5 g 11    apixaban (ELIQUIS) 5mg Tab Take 1 Tablet by mouth 2 times a day. 60 Tablet 5    Mometasone Furoate 100 MCG/ACT Aerosol Inhale 1 Puff 2 times a day. (Patient not taking: Reported on 2023) 1 Each 0     No current facility-administered medications on file prior to visit.       Social History     Tobacco Use    Smoking status: Former     Packs/day: 1.00     Years: 25.00     Pack years: 25.00     Types: Cigarettes     Quit date: 1980     Years since quittin.5    Smokeless tobacco: Never   Vaping Use    Vaping Use: Never used   Substance Use Topics    Alcohol use: Yes     Comment: 2 per week    Drug use: Yes     Comment: Occassional marijuana edible        Past Medical History:   Diagnosis Date    Arthritis     fingers    Blood clotting disorder (HCC)     xarelto x 1 year, left leg    Bowel habit changes     Bronchitis     Chest tightness     Cold 2018    \"Minor cold one week ago\" Denies productive cough, SOB    Cough     Dental disorder     upper frontal bridge    Hyperlipidemia     Obesity     Pain 2018    left knee, back    Psychiatric problem     depression    Shortness of breath     Sputum production     Unspecified urinary incontinence     stress       Past Surgical History:   Procedure Laterality Date    PB TOTAL KNEE ARTHROPLASTY Left 10/13/2022    Procedure: LEFT TOTAL KNEE ARTHROPLASTY;  Surgeon: Rick Portillo M.D.;  Location: [facility] [place];  Service: Orthopedics    KNEE ARTHROSCOPY Left 2018    Procedure: KNEE ARTHROSCOPY;  Surgeon: Tom Moore M.D.;  Location: [facility];  Service: Orthopedics    MENISCECTOMY, KNEE, MEDIAL Left 2018    Procedure: MEDIAL MENISCECTOMY - PARTIAL;  Surgeon: Tom Moore M.D.;  Location: [facility];  Service: " "Orthopedics    CARPAL TUNNEL ENDOSCOPIC Right 5/3/2017    Procedure: CARPAL TUNNEL ENDOSCOPIC;  Surgeon: Iván Edge M.D.;  Location: SURGERY [facility];  Service:     LESION EXCISION ORTHO  1/27/2014    Performed by Fermín Edge M.D. at SURGERY SAME DAY [facility]    BONE SPUR EXCISION  11/11/2011    Performed by REGINE PERERA at SURGERY SAME DAY [facility]    TOE ARTHROPLASTY  11/11/2011    Performed by REGINE PERERA at SURGERY SAME DAY [facility]    COLONOSCOPY  2009,recheck 10    HEMORRHOIDECTOMY      KNEE ARTHROSCOPY      OTHER ORTHOPEDIC SURGERY      toe joint surgery       Allergies: Patient has no known allergies.    Family History   Problem Relation Age of Onset    Cancer Father         lung    Arthritis Mother         rheumatoid arthritis       Vitals:    07/05/23 0805   Height: 1.676 m (5' 6\")   Weight: 81.6 kg (180 lb)   Weight % change since last entry.: 0 %   BP: 124/74   Pulse: 69   BMI (Calculated): 29.05   Resp: 16       Physical Examination  Physical Exam  Constitutional:       Appearance: Normal appearance.   HENT:      Head: Normocephalic and atraumatic.      Mouth/Throat:      Mouth: Mucous membranes are moist.   Cardiovascular:      Rate and Rhythm: Normal rate.   Pulmonary:      Effort: Pulmonary effort is normal.      Breath sounds: Normal breath sounds.   Musculoskeletal:         General: Normal range of motion.      Cervical back: Normal range of motion.   Skin:     General: Skin is warm and dry.   Neurological:      General: No focal deficit present.      Mental Status: She is alert and oriented to person, place, and time.                Zully Pike M.D., MD MPH [facility]  [facility]  "

## 2023-07-07 NOTE — PROGRESS NOTES
"07/14/23    Subjective    Chief Complaint:  History of DVT and PE    HPI:  67 female referred for consultation by MORGAN Fournier because of a recent history of RLE DVT followed by a pulmonary embolism in March of this year.She had a prior LLE DVT a year prior after a vein procedure and a RLE 2 years ago. In 2014 she had normal protein S, C and ATIII, negative Factor V Leiden and negative lupus anticoagulant studies as well as negative anti-cardiolipin abs. She did have a weakly positive JOHNNIE at < 1:40 at that time.     ROS:    Constitutional: No weight loss  Skin: No rash or jaundice  HENT: No change in eyesight or hearing  Cardiovascular:No chest pain or arrythmia  Respiratory:No cough or SOB  GI:No nausea, vomiting, diarrhea, constipation  :No dysuria or frequency  Musculoskeletal:No bone or joint pain  Neuro:No sx's of neuropathy  Psych: No complaints    PMH:      No Known Allergies    Past Medical History:   Diagnosis Date    Arthritis     fingers    Blood clotting disorder (HCC) 2014    xarelto x 1 year, left leg    Bowel habit changes     Bronchitis     Chest tightness     Cold 04/18/2018    \"Minor cold one week ago\" Denies productive cough, SOB    Cough     Dental disorder     upper frontal bridge    Hyperlipidemia     Obesity     Pain 04/2018    left knee, back    Psychiatric problem     depression    Shortness of breath     Sputum production     Unspecified urinary incontinence     stress        Past Surgical History:   Procedure Laterality Date    PB TOTAL KNEE ARTHROPLASTY Left 10/13/2022    Procedure: LEFT TOTAL KNEE ARTHROPLASTY;  Surgeon: Rick Portillo M.D.;  Location: [facility] [place];  Service: Orthopedics    KNEE ARTHROSCOPY Left 4/23/2018    Procedure: KNEE ARTHROSCOPY;  Surgeon: Tom Moore M.D.;  Location: SURGERY [facility];  Service: Orthopedics    MENISCECTOMY, KNEE, MEDIAL Left 4/23/2018    Procedure: MEDIAL MENISCECTOMY - PARTIAL;  Surgeon: Tom DAMON" "LUIS Moore;  Location: SURGERY [facility];  Service: Orthopedics    CARPAL TUNNEL ENDOSCOPIC Right 5/3/2017    Procedure: CARPAL TUNNEL ENDOSCOPIC;  Surgeon: Iván Edge M.D.;  Location: SURGERY [facility];  Service:     LESION EXCISION ORTHO  2014    Performed by Fermín Edge M.D. at SURGERY SAME DAY [facility]    BONE SPUR EXCISION  2011    Performed by REGINE PERERA at SURGERY SAME DAY [facility]    TOE ARTHROPLASTY  2011    Performed by REGINE PERERA at SURGERY SAME DAY [facility]    COLONOSCOPY  2009,recheck 10    HEMORRHOIDECTOMY      KNEE ARTHROSCOPY      OTHER ORTHOPEDIC SURGERY      toe joint surgery        Medications:    Current Outpatient Medications on File Prior to Encounter   Medication Sig Dispense Refill    albuterol 108 (90 Base) MCG/ACT Aero Soln inhalation aerosol Inhale 2 Puffs every 6 hours as needed for Shortness of Breath. 8.5 g 11    apixaban (ELIQUIS) 5mg Tab Take 1 Tablet by mouth 2 times a day. 60 Tablet 5    Mometasone Furoate 100 MCG/ACT Aerosol Inhale 1 Puff 2 times a day. (Patient not taking: Reported on 2023) 1 Each 0     No current facility-administered medications on file prior to encounter.       Social History     Tobacco Use    Smoking status: Former     Packs/day: 1.00     Years: 25.00     Pack years: 25.00     Types: Cigarettes     Quit date: 1980     Years since quittin.5    Smokeless tobacco: Never   Substance Use Topics    Alcohol use: Yes     Comment: 2 per week        Family History   Problem Relation Age of Onset    Cancer Father         lung    Arthritis Mother         rheumatoid arthritis        Objective    Vitals:    /68 (BP Location: Right arm, Patient Position: Sitting, BP Cuff Size: Small adult)   Pulse 77   Temp 37 °C (98.6 °F) (Temporal)   Resp 16   Ht 1.676 m (5' 5.98\")   Wt 81.4 kg (179 lb 5.5 oz)   SpO2 98%   BMI 28.96 kg/m²     Physical Exam: Initially by Dr." Fazal    Appears well-developed and well-nourished. No distress.    Head -  Normocephalic .   Eyes - Pupils are equal. Conjunctivae normal. No scleral icterus.   Ears - normal hearing  Mouth - Throat: Oropharynx is clear and moist. No oropharyngeal exudate  Neck - Neck supple. No thyromegaly  Cardiovascular - Normal rate, regular rhythm, normal heart sounds and intact distal pulses. No  gallop, murmur or rub  Pulmonary - Normal breath sounds.  No wheeze, rales or rhonchi  Breast - Dense. Symmetrical. No mass on indentation.  Abdominal -Soft. No distension, tenderness, organomegaly or mass  Extremities-  No edema or tenderness.  Lipoma right upper medial arm  Nodes - No submental, submandibular, preauricular, cervical, axillary or inguinal adenopathy.    Neurological -   Alert and oriented.  Skin - Skin is warm and dry. No rash noted. Not diaphoretic. No erythema. No pallor. No jaundice   Psychiatric -  Normal mood and affect.    Labs:      Assessment  No obvious thrombophilia but with 3 clots, at least two unprovoked  Imp:    Visit Diagnosis:    1. History of DVT (deep vein thrombosis)        2. History of pulmonary embolism          Plan:  Given that she has had three clots, the prudent thing would be just stay on Elquis indeficintely. The patient is comfortable with this  RTC prn    Fermín Elizabeth M.D.

## 2023-07-08 PROBLEM — E04.1 THYROID NODULE: Status: ACTIVE | Noted: 2023-07-08

## 2023-07-08 NOTE — ASSESSMENT & PLAN NOTE
1.8 cm on the right  Seen on CT chest  Pt not seeing her PCP for another 3 weeks  Ordered thyroid US

## 2023-07-08 NOTE — ASSESSMENT & PLAN NOTE
Pt did have b/l pulm nodules seen on repeat CT 6/2023  HILTON is 9 mm and unchanged in size, rul looks smaller and lower lobe also looks smaller  Risk of malignancy based on [facility] calculator 19%  We reviewed GG nodules - could be benign or could be slow growing malignancy  Pt would like to think about whether to pursue bx or follow up with imaging  She will let us know bu end of the month

## 2023-07-14 ENCOUNTER — HOSPITAL ENCOUNTER (OUTPATIENT)
Dept: HEMATOLOGY ONCOLOGY | Facility: MEDICAL CENTER | Age: 68
End: 2023-07-14
Attending: INTERNAL MEDICINE
Payer: MEDICARE

## 2023-07-14 VITALS
DIASTOLIC BLOOD PRESSURE: 68 MMHG | WEIGHT: 179.34 LBS | BODY MASS INDEX: 28.82 KG/M2 | OXYGEN SATURATION: 98 % | SYSTOLIC BLOOD PRESSURE: 108 MMHG | RESPIRATION RATE: 16 BRPM | TEMPERATURE: 98.6 F | HEIGHT: 66 IN | HEART RATE: 77 BPM

## 2023-07-14 DIAGNOSIS — Z86.718 HISTORY OF DVT (DEEP VEIN THROMBOSIS): ICD-10-CM

## 2023-07-14 DIAGNOSIS — Z86.711 HISTORY OF PULMONARY EMBOLISM: ICD-10-CM

## 2023-07-14 PROCEDURE — 99212 OFFICE O/P EST SF 10 MIN: CPT | Performed by: INTERNAL MEDICINE

## 2023-07-14 PROCEDURE — 99204 OFFICE O/P NEW MOD 45 MIN: CPT | Performed by: INTERNAL MEDICINE

## 2023-07-14 ASSESSMENT — FIBROSIS 4 INDEX: FIB4 SCORE: 1.11

## 2023-07-14 NOTE — ADDENDUM NOTE
Encounter addended by: Rich Serna on: 7/14/2023 4:34 PM   Actions taken: Charge Capture section accepted

## 2023-07-18 ENCOUNTER — HOSPITAL ENCOUNTER (OUTPATIENT)
Dept: RADIOLOGY | Facility: MEDICAL CENTER | Age: 68
End: 2023-07-18
Attending: INTERNAL MEDICINE
Payer: MEDICARE

## 2023-07-18 DIAGNOSIS — E04.1 THYROID NODULE: ICD-10-CM

## 2023-07-18 PROCEDURE — 76536 US EXAM OF HEAD AND NECK: CPT

## 2023-07-25 ENCOUNTER — TELEPHONE (OUTPATIENT)
Dept: MEDICAL GROUP | Facility: LAB | Age: 68
End: 2023-07-25
Payer: MEDICARE

## 2023-07-25 RX ORDER — PHENTERMINE HYDROCHLORIDE 37.5 MG/1
TABLET ORAL
COMMUNITY
End: 2023-07-25

## 2023-07-25 RX ORDER — HYDROCODONE BITARTRATE AND ACETAMINOPHEN 5; 325 MG/1; MG/1
TABLET ORAL
COMMUNITY
End: 2023-07-25

## 2023-07-25 RX ORDER — DICLOFENAC SODIUM 20 MG/G
SOLUTION TOPICAL
COMMUNITY
End: 2023-07-25

## 2023-07-25 NOTE — TELEPHONE ENCOUNTER
NEW PATIENT VISIT PRE-VISIT PLANNING    1.  E[employer] Patient is checked in Patient Demographics?Yes    2.  Immunizations were updated in Epic using Reconcile Outside Information activity? Yes         3.  Is this appointment scheduled as a Hospital Follow-Up? No    4.  Patient is due for the following Health Maintenance Topics:   Health Maintenance Due   Topic Date Due    Annual Wellness Visit  Never done    COLORECTAL CANCER SCREENING  Never done    MAMMOGRAM  04/12/2019    COVID-19 Vaccine (6 - Pfizer series) 04/06/2023     5.  Reviewed/Updated the following with patient:          Preferred Pharmacy? Yes          Preferred Lab? Yes          Preferred Communication? Yes          Allergies? Yes          Medications? YES. Was Abstract Encounter opened and chart updated? YES  6.  Updated Care Team?          DME Company (gait device, O2, CPAP, etc.) N\A          Other Specialists (eye doctor, derm, GYN, cardiology, endo, etc): YES    7.  AHA (Puls8) form printed for Provider? N/A

## 2023-07-25 NOTE — TELEPHONE ENCOUNTER
Pt said she had Mammogram recently at [facility]. I called and asked for report from April faxed to us  [phone].    Records in chart

## 2023-07-25 NOTE — TELEPHONE ENCOUNTER
Zora said she had colonoscopy with [facility] within the last 5 years. I called to check and get records.  Colorectal Care Gap Outreach    1. Confirmed patient is between the ages of 50-75: YES     2. Confirmed that patient IS overdue or due soon for colorectal cancer screening: YES     3. Were orders placed within the last 12 months to complete screening: NO     Phone Number Called: [phone]  Call outcome: Patient has completed Colonoscopy and HMR Letter faxed to: [facility]   Address   [facility]            [address] [place] [address] Phone:  [phone]      Fax:       [phone]       I called and asked for records to be faxed to [phone]  _____________________________________________________________________    Colon Cancer Screening Guidelines:     Important: If patient has any history of colon polyps or family history of colorectal cancer, FIT and Cologuard are NOT appropriate options. A colonoscopy is the recommended test for this set of patients.    Colonoscopy  Always recommend colonoscopy first.   A colonoscopy is recommended over the other tests because it provides direct visualization of the colon and if there are small polyps these can also be removed with one procedure.  If negative and no family history, could be cleared for 10 years.     Cologuard/FIT  Cologuard is completed once every 3 years.  FIT is completed annually.  If positive, Cologuard and FIT will require a diagnostic colonoscopy. Screening colonoscopies are classically covered by insurances, however, diagnostic colonoscopies may result in a bill.

## 2023-07-26 SDOH — HEALTH STABILITY: PHYSICAL HEALTH: ON AVERAGE, HOW MANY MINUTES DO YOU ENGAGE IN EXERCISE AT THIS LEVEL?: 40 MIN

## 2023-07-26 SDOH — ECONOMIC STABILITY: HOUSING INSECURITY
IN THE LAST 12 MONTHS, WAS THERE A TIME WHEN YOU DID NOT HAVE A STEADY PLACE TO SLEEP OR SLEPT IN A SHELTER (INCLUDING NOW)?: NO

## 2023-07-26 SDOH — ECONOMIC STABILITY: TRANSPORTATION INSECURITY
IN THE PAST 12 MONTHS, HAS THE LACK OF TRANSPORTATION KEPT YOU FROM MEDICAL APPOINTMENTS OR FROM GETTING MEDICATIONS?: NO

## 2023-07-26 SDOH — HEALTH STABILITY: PHYSICAL HEALTH: ON AVERAGE, HOW MANY DAYS PER WEEK DO YOU ENGAGE IN MODERATE TO STRENUOUS EXERCISE (LIKE A BRISK WALK)?: 5 DAYS

## 2023-07-26 SDOH — ECONOMIC STABILITY: FOOD INSECURITY: WITHIN THE PAST 12 MONTHS, YOU WORRIED THAT YOUR FOOD WOULD RUN OUT BEFORE YOU GOT MONEY TO BUY MORE.: NEVER TRUE

## 2023-07-26 SDOH — ECONOMIC STABILITY: HOUSING INSECURITY: IN THE LAST 12 MONTHS, HOW MANY PLACES HAVE YOU LIVED?: 1

## 2023-07-26 SDOH — ECONOMIC STABILITY: FOOD INSECURITY: WITHIN THE PAST 12 MONTHS, THE FOOD YOU BOUGHT JUST DIDN'T LAST AND YOU DIDN'T HAVE MONEY TO GET MORE.: NEVER TRUE

## 2023-07-26 SDOH — ECONOMIC STABILITY: TRANSPORTATION INSECURITY
IN THE PAST 12 MONTHS, HAS LACK OF RELIABLE TRANSPORTATION KEPT YOU FROM MEDICAL APPOINTMENTS, MEETINGS, WORK OR FROM GETTING THINGS NEEDED FOR DAILY LIVING?: NO

## 2023-07-26 SDOH — ECONOMIC STABILITY: TRANSPORTATION INSECURITY
IN THE PAST 12 MONTHS, HAS LACK OF TRANSPORTATION KEPT YOU FROM MEETINGS, WORK, OR FROM GETTING THINGS NEEDED FOR DAILY LIVING?: NO

## 2023-07-26 SDOH — ECONOMIC STABILITY: INCOME INSECURITY: HOW HARD IS IT FOR YOU TO PAY FOR THE VERY BASICS LIKE FOOD, HOUSING, MEDICAL CARE, AND HEATING?: NOT HARD AT ALL

## 2023-07-26 SDOH — ECONOMIC STABILITY: INCOME INSECURITY: IN THE LAST 12 MONTHS, WAS THERE A TIME WHEN YOU WERE NOT ABLE TO PAY THE MORTGAGE OR RENT ON TIME?: NO

## 2023-07-26 SDOH — HEALTH STABILITY: MENTAL HEALTH
STRESS IS WHEN SOMEONE FEELS TENSE, NERVOUS, ANXIOUS, OR CAN'T SLEEP AT NIGHT BECAUSE THEIR MIND IS TROUBLED. HOW STRESSED ARE YOU?: RATHER MUCH

## 2023-07-26 ASSESSMENT — SOCIAL DETERMINANTS OF HEALTH (SDOH)
HOW HARD IS IT FOR YOU TO PAY FOR THE VERY BASICS LIKE FOOD, HOUSING, MEDICAL CARE, AND HEATING?: NOT HARD AT ALL
HOW OFTEN DO YOU HAVE SIX OR MORE DRINKS ON ONE OCCASION: NEVER
HOW OFTEN DO YOU ATTENT MEETINGS OF THE CLUB OR ORGANIZATION YOU BELONG TO?: MORE THAN 4 TIMES PER YEAR
WITHIN THE PAST 12 MONTHS, YOU WORRIED THAT YOUR FOOD WOULD RUN OUT BEFORE YOU GOT THE MONEY TO BUY MORE: NEVER TRUE
HOW OFTEN DO YOU GET TOGETHER WITH FRIENDS OR RELATIVES?: TWICE A WEEK
HOW MANY DRINKS CONTAINING ALCOHOL DO YOU HAVE ON A TYPICAL DAY WHEN YOU ARE DRINKING: 1 OR 2
HOW OFTEN DO YOU GET TOGETHER WITH FRIENDS OR RELATIVES?: TWICE A WEEK
HOW OFTEN DO YOU ATTEND CHURCH OR RELIGIOUS SERVICES?: NEVER
IN A TYPICAL WEEK, HOW MANY TIMES DO YOU TALK ON THE PHONE WITH FAMILY, FRIENDS, OR NEIGHBORS?: THREE TIMES A WEEK
DO YOU BELONG TO ANY CLUBS OR ORGANIZATIONS SUCH AS CHURCH GROUPS UNIONS, FRATERNAL OR ATHLETIC GROUPS, OR SCHOOL GROUPS?: YES
HOW OFTEN DO YOU HAVE A DRINK CONTAINING ALCOHOL: 2-4 TIMES A MONTH
HOW OFTEN DO YOU ATTENT MEETINGS OF THE CLUB OR ORGANIZATION YOU BELONG TO?: MORE THAN 4 TIMES PER YEAR
IN A TYPICAL WEEK, HOW MANY TIMES DO YOU TALK ON THE PHONE WITH FAMILY, FRIENDS, OR NEIGHBORS?: THREE TIMES A WEEK
HOW OFTEN DO YOU ATTEND CHURCH OR RELIGIOUS SERVICES?: NEVER
DO YOU BELONG TO ANY CLUBS OR ORGANIZATIONS SUCH AS CHURCH GROUPS UNIONS, FRATERNAL OR ATHLETIC GROUPS, OR SCHOOL GROUPS?: YES

## 2023-07-26 ASSESSMENT — LIFESTYLE VARIABLES
HOW MANY STANDARD DRINKS CONTAINING ALCOHOL DO YOU HAVE ON A TYPICAL DAY: 1 OR 2
HOW OFTEN DO YOU HAVE A DRINK CONTAINING ALCOHOL: 2-4 TIMES A MONTH
HOW OFTEN DO YOU HAVE SIX OR MORE DRINKS ON ONE OCCASION: NEVER
SKIP TO QUESTIONS 9-10: 1
AUDIT-C TOTAL SCORE: 2

## 2023-07-27 ENCOUNTER — OFFICE VISIT (OUTPATIENT)
Dept: MEDICAL GROUP | Facility: LAB | Age: 68
End: 2023-07-27
Payer: MEDICARE

## 2023-07-27 VITALS
DIASTOLIC BLOOD PRESSURE: 62 MMHG | RESPIRATION RATE: 12 BRPM | OXYGEN SATURATION: 96 % | HEIGHT: 66 IN | SYSTOLIC BLOOD PRESSURE: 106 MMHG | WEIGHT: 184 LBS | TEMPERATURE: 98.3 F | BODY MASS INDEX: 29.57 KG/M2 | HEART RATE: 72 BPM

## 2023-07-27 DIAGNOSIS — R93.89 ABNORMAL ULTRASOUND: ICD-10-CM

## 2023-07-27 DIAGNOSIS — Z13.29 SCREENING FOR THYROID DISORDER: ICD-10-CM

## 2023-07-27 DIAGNOSIS — E55.9 VITAMIN D DEFICIENCY: ICD-10-CM

## 2023-07-27 DIAGNOSIS — R93.89 ABNORMAL CHEST CT: ICD-10-CM

## 2023-07-27 DIAGNOSIS — Z76.89 ESTABLISHING CARE WITH NEW DOCTOR, ENCOUNTER FOR: ICD-10-CM

## 2023-07-27 DIAGNOSIS — E66.3 OVERWEIGHT (BMI 25.0-29.9): ICD-10-CM

## 2023-07-27 DIAGNOSIS — E78.5 DYSLIPIDEMIA: ICD-10-CM

## 2023-07-27 PROCEDURE — 3074F SYST BP LT 130 MM HG: CPT | Performed by: INTERNAL MEDICINE

## 2023-07-27 PROCEDURE — 3078F DIAST BP <80 MM HG: CPT | Performed by: INTERNAL MEDICINE

## 2023-07-27 PROCEDURE — 99204 OFFICE O/P NEW MOD 45 MIN: CPT | Performed by: INTERNAL MEDICINE

## 2023-07-27 ASSESSMENT — FIBROSIS 4 INDEX: FIB4 SCORE: 1.11

## 2023-07-28 ENCOUNTER — PATIENT MESSAGE (OUTPATIENT)
Dept: MEDICAL GROUP | Facility: LAB | Age: 68
End: 2023-07-28
Payer: MEDICARE

## 2023-07-28 NOTE — PROGRESS NOTES
CC: Zora Mcneil is a 67 y.o. female is suffering from   Chief Complaint   Patient presents with    Establish Care         SUBJECTIVE:  1. Establishing care with new doctor, encounter for  Patient patient is here for establishment of care, has multiple medical issues.    2. Abnormal ultrasound  Patient with a history of an abnormal ultrasound of her thyroid, biopsy has been recommended referrals been made    3. Abnormal chest CT  Abnormal CT of the chest to be completed in 2023    4. Overweight (BMI 25.0-29.9)  Consider Ozempic    5. Dyslipidemia  Recheck lipid profile CT cardiac scoring ordered    6. Screening for thyroid disorder  Recheck free T4 TSH    7. Vitamin D deficiency  Vitamin D ordered        Past social, family, history: , retired schoolteacher  Social History     Tobacco Use    Smoking status: Former     Packs/day: 1.00     Years: 25.00     Pack years: 25.00     Types: Cigarettes     Quit date: 1980     Years since quittin.5    Smokeless tobacco: Never   Vaping Use    Vaping Use: Never used   Substance Use Topics    Alcohol use: Yes     Comment: 2 per week    Drug use: Yes     Comment: Occassional marijuana edible         MEDICATIONS:    Current Outpatient Medications:     Semaglutide,0.25 or 0.5MG/DOS, 2 MG/3ML Solution Pen-injector, Inject 0.5 mg under the skin every 7 days., Disp: 3 mL, Rfl: 11    albuterol 108 (90 Base) MCG/ACT Aero Soln inhalation aerosol, Inhale 2 Puffs every 6 hours as needed for Shortness of Breath., Disp: 8.5 g, Rfl: 11    apixaban (ELIQUIS) 5mg Tab, Take 1 Tablet by mouth 2 times a day., Disp: 60 Tablet, Rfl: 5    Mometasone Furoate 100 MCG/ACT Aerosol, Inhale 1 Puff 2 times a day. (Patient not taking: Reported on 2023), Disp: 1 Each, Rfl: 0    PROBLEMS:  Patient Active Problem List    Diagnosis Date Noted    Thyroid nodule 2023    Chronic cough 2023    Pulmonary nodules 2023    Pulmonary embolism, bilateral (HCC)  "03/16/2023    Right leg DVT (HCC) 03/16/2023    Arthritis of left knee 10/13/2022    Osteoarthritis, knee 06/14/2022    Closed fracture of upper end of right radius with routine healing 07/06/2021    Carpal tunnel syndrome on right 05/03/2017    Left leg DVT (HCC) 04/18/2014    Generalized osteoarthrosis of hand 01/27/2014    Overactive bladder 12/04/2012    Hyperlipidemia     Depression     Obesity        REVIEW OF SYSTEMS:  General: Patient denies any problems with nausea vomiting fever chills chest pain or tightness.  Head:  No history of strokes seizures severe head trauma or loss of consciousness.   HEENT: No history of any significant vision loss, hearing loss, changes in sense of smell hoarseness  Heart: No chest pain tightness squeezing.   Lungs: No recurrent asthma bronchitis pneumonia.   Gastrointestinal: No history of black or bloody stools or  Constipation colonoscopy was done.  Genitourinary:  No increased frequency urgency pain and burning with urination  Musculoskeletal: No joint pain or discomfort.   Skin: No skin changes      PHYSICAL EXAM   Constitutional: Alert, cooperative, not in acute distress.  Cardiovascular:  Rate Rhythm is regular without murmurs rubs clicks.     Thorax & Lungs: Clear to auscultation, no wheezing, rhonchi, or rales  HENT: Normocephalic, Atraumatic.  Eyes: PERRLA, EOMI, Conjunctiva normal.   Neck: Trachia is midline no swelling of the thyroid.   Lymphatic: No lymphadenopathy noted.   Musculoskeletal: Moves all extremities without difficulty  Neurologic: Alert & oriented x 3, cranial nerves II through XII are intact, Normal motor function, Normal sensory function, No focal deficits noted.   Psychiatric: Affect normal, Judgment normal, Mood normal.     VITAL SIGNS:/62   Pulse 72   Temp 36.8 °C (98.3 °F) (Temporal)   Resp 12   Ht 1.676 m (5' 6\")   Wt 83.5 kg (184 lb)   SpO2 96%   BMI 29.70 kg/m²     Labs: Reviewed    Assessment:                                      "                Plan:    1. Establishing care with new doctor, encounter for  Clinically stable    2. Abnormal ultrasound  FNA ultrasound guided thyroid biopsy ordered  - POC FNA BIOPSY US GUIDED THYROID/PARATHYROID/LYMPH NODE; Future  - Comp Metabolic Panel; Future  - CBC WITH DIFFERENTIAL; Future    3. Abnormal chest CT  CT of the chest ordered October 31, 2023  - CT-CHEST (THORAX) W/O; Future  - Comp Metabolic Panel; Future  - CBC WITH DIFFERENTIAL; Future    4. Overweight (BMI 25.0-29.9)  Start Ozempic  - Semaglutide,0.25 or 0.5MG/DOS, 2 MG/3ML Solution Pen-injector; Inject 0.5 mg under the skin every 7 days.  Dispense: 3 mL; Refill: 11  - Comp Metabolic Panel; Future  - CBC WITH DIFFERENTIAL; Future    5. Dyslipidemia  Recheck lipid profile  - CT-CARDIAC SCORING; Future  - Comp Metabolic Panel; Future  - CBC WITH DIFFERENTIAL; Future  - Lipid Profile; Future    6. Screening for thyroid disorder  Check free T4 TSH  - FREE THYROXINE; Future  - TSH; Future    7. Vitamin D deficiency  Recheck vitamin D  - VITAMIN D 25-HYDROXY

## 2023-07-31 ENCOUNTER — TELEPHONE (OUTPATIENT)
Dept: MEDICAL GROUP | Facility: LAB | Age: 68
End: 2023-07-31
Payer: MEDICARE

## 2023-07-31 DIAGNOSIS — E04.1 THYROID NODULE: ICD-10-CM

## 2023-07-31 NOTE — TELEPHONE ENCOUNTER
There is a co sign needed for a FNA thyroid biopsy.  This should be in your in basket waiting for a signature.  Please sign this asap so the pt can get scheduled.

## 2023-08-01 ENCOUNTER — HOSPITAL ENCOUNTER (OUTPATIENT)
Dept: RADIOLOGY | Facility: MEDICAL CENTER | Age: 68
End: 2023-08-01
Attending: INTERNAL MEDICINE
Payer: COMMERCIAL

## 2023-08-01 DIAGNOSIS — E78.5 DYSLIPIDEMIA: ICD-10-CM

## 2023-08-01 PROCEDURE — 4410556 CT-CARDIAC SCORING (SELF PAY ONLY)

## 2023-08-04 ENCOUNTER — HOSPITAL ENCOUNTER (OUTPATIENT)
Dept: RADIOLOGY | Facility: MEDICAL CENTER | Age: 68
End: 2023-08-04
Attending: INTERNAL MEDICINE
Payer: MEDICARE

## 2023-08-04 DIAGNOSIS — R93.89 ABNORMAL ULTRASOUND: ICD-10-CM

## 2023-08-04 LAB — CYTOLOGY REG CYTOL: NORMAL

## 2023-08-04 PROCEDURE — 10005 FNA BX W/US GDN 1ST LES: CPT

## 2023-08-04 PROCEDURE — 88173 CYTOPATH EVAL FNA REPORT: CPT

## 2023-08-04 NOTE — PROGRESS NOTES
US guided right thyroid nodule fine needle aspiration done by Dr. Humphreys; NON-SEDATION (no H&P required as this is a NON SEDATION procedure) right anterior aspect of neck access site, dressing CDI; 3 samples placed in 1 jar of cytolyt obtained, 2 samples in 1 vial afirma obtained and hand delivered to pathology lab. Pt tolerated the procedure well. Pt hemodynamically stable pre/intra/post procedure; all questions and concerns answered prior to being d/c; patient provided with appropriate education for procedure; pt d/c home.

## 2023-08-08 ENCOUNTER — HOSPITAL ENCOUNTER (OUTPATIENT)
Dept: LAB | Facility: MEDICAL CENTER | Age: 68
End: 2023-08-08
Attending: INTERNAL MEDICINE
Payer: MEDICARE

## 2023-08-08 DIAGNOSIS — R93.89 ABNORMAL ULTRASOUND: ICD-10-CM

## 2023-08-08 DIAGNOSIS — Z13.29 SCREENING FOR THYROID DISORDER: ICD-10-CM

## 2023-08-08 DIAGNOSIS — E78.5 DYSLIPIDEMIA: ICD-10-CM

## 2023-08-08 DIAGNOSIS — E66.3 OVERWEIGHT (BMI 25.0-29.9): ICD-10-CM

## 2023-08-08 DIAGNOSIS — R93.89 ABNORMAL CHEST CT: ICD-10-CM

## 2023-08-08 LAB
25(OH)D3 SERPL-MCNC: 45 NG/ML (ref 30–100)
ALBUMIN SERPL BCP-MCNC: 4.4 G/DL (ref 3.2–4.9)
ALBUMIN/GLOB SERPL: 1.6 G/DL
ALP SERPL-CCNC: 85 U/L (ref 30–99)
ALT SERPL-CCNC: 14 U/L (ref 2–50)
ANION GAP SERPL CALC-SCNC: 12 MMOL/L (ref 7–16)
AST SERPL-CCNC: 18 U/L (ref 12–45)
BASOPHILS # BLD AUTO: 0.3 % (ref 0–1.8)
BASOPHILS # BLD: 0.01 K/UL (ref 0–0.12)
BILIRUB SERPL-MCNC: 0.7 MG/DL (ref 0.1–1.5)
BUN SERPL-MCNC: 28 MG/DL (ref 8–22)
CALCIUM ALBUM COR SERPL-MCNC: 9.2 MG/DL (ref 8.5–10.5)
CALCIUM SERPL-MCNC: 9.5 MG/DL (ref 8.5–10.5)
CHLORIDE SERPL-SCNC: 103 MMOL/L (ref 96–112)
CHOLEST SERPL-MCNC: 223 MG/DL (ref 100–199)
CO2 SERPL-SCNC: 23 MMOL/L (ref 20–33)
CREAT SERPL-MCNC: 0.78 MG/DL (ref 0.5–1.4)
EOSINOPHIL # BLD AUTO: 0.06 K/UL (ref 0–0.51)
EOSINOPHIL NFR BLD: 1.6 % (ref 0–6.9)
ERYTHROCYTE [DISTWIDTH] IN BLOOD BY AUTOMATED COUNT: 42.4 FL (ref 35.9–50)
FASTING STATUS PATIENT QL REPORTED: NORMAL
GFR SERPLBLD CREATININE-BSD FMLA CKD-EPI: 82 ML/MIN/1.73 M 2
GLOBULIN SER CALC-MCNC: 2.8 G/DL (ref 1.9–3.5)
GLUCOSE SERPL-MCNC: 88 MG/DL (ref 65–99)
HCT VFR BLD AUTO: 39.5 % (ref 37–47)
HDLC SERPL-MCNC: 51 MG/DL
HGB BLD-MCNC: 13 G/DL (ref 12–16)
IMM GRANULOCYTES # BLD AUTO: 0.01 K/UL (ref 0–0.11)
IMM GRANULOCYTES NFR BLD AUTO: 0.3 % (ref 0–0.9)
LDLC SERPL CALC-MCNC: 162 MG/DL
LYMPHOCYTES # BLD AUTO: 1.27 K/UL (ref 1–4.8)
LYMPHOCYTES NFR BLD: 33.1 % (ref 22–41)
MCH RBC QN AUTO: 29.6 PG (ref 27–33)
MCHC RBC AUTO-ENTMCNC: 32.9 G/DL (ref 32.2–35.5)
MCV RBC AUTO: 90 FL (ref 81.4–97.8)
MONOCYTES # BLD AUTO: 0.35 K/UL (ref 0–0.85)
MONOCYTES NFR BLD AUTO: 9.1 % (ref 0–13.4)
NEUTROPHILS # BLD AUTO: 2.14 K/UL (ref 1.82–7.42)
NEUTROPHILS NFR BLD: 55.6 % (ref 44–72)
NRBC # BLD AUTO: 0 K/UL
NRBC BLD-RTO: 0 /100 WBC (ref 0–0.2)
PLATELET # BLD AUTO: 222 K/UL (ref 164–446)
PMV BLD AUTO: 10.8 FL (ref 9–12.9)
POTASSIUM SERPL-SCNC: 4.2 MMOL/L (ref 3.6–5.5)
PROT SERPL-MCNC: 7.2 G/DL (ref 6–8.2)
RBC # BLD AUTO: 4.39 M/UL (ref 4.2–5.4)
SODIUM SERPL-SCNC: 138 MMOL/L (ref 135–145)
T4 FREE SERPL-MCNC: 1.58 NG/DL (ref 0.93–1.7)
TRIGL SERPL-MCNC: 52 MG/DL (ref 0–149)
TSH SERPL DL<=0.005 MIU/L-ACNC: 0.62 UIU/ML (ref 0.38–5.33)
WBC # BLD AUTO: 3.8 K/UL (ref 4.8–10.8)

## 2023-08-08 PROCEDURE — 82306 VITAMIN D 25 HYDROXY: CPT

## 2023-08-08 PROCEDURE — 80061 LIPID PANEL: CPT

## 2023-08-08 PROCEDURE — 85025 COMPLETE CBC W/AUTO DIFF WBC: CPT

## 2023-08-08 PROCEDURE — 36415 COLL VENOUS BLD VENIPUNCTURE: CPT

## 2023-08-08 PROCEDURE — 84443 ASSAY THYROID STIM HORMONE: CPT

## 2023-08-08 PROCEDURE — 80053 COMPREHEN METABOLIC PANEL: CPT

## 2023-08-08 PROCEDURE — 84439 ASSAY OF FREE THYROXINE: CPT

## 2023-08-24 ENCOUNTER — PATIENT MESSAGE (OUTPATIENT)
Dept: MEDICAL GROUP | Facility: LAB | Age: 68
End: 2023-08-24
Payer: MEDICARE

## 2023-08-24 DIAGNOSIS — I26.99 PULMONARY EMBOLISM, BILATERAL (HCC): ICD-10-CM

## 2023-09-14 ENCOUNTER — OFFICE VISIT (OUTPATIENT)
Dept: MEDICAL GROUP | Facility: LAB | Age: 68
End: 2023-09-14
Payer: MEDICARE

## 2023-09-14 VITALS
DIASTOLIC BLOOD PRESSURE: 62 MMHG | TEMPERATURE: 97.6 F | HEART RATE: 68 BPM | WEIGHT: 179 LBS | OXYGEN SATURATION: 97 % | RESPIRATION RATE: 16 BRPM | SYSTOLIC BLOOD PRESSURE: 118 MMHG | HEIGHT: 66 IN | BODY MASS INDEX: 28.77 KG/M2

## 2023-09-14 DIAGNOSIS — Z20.9 EXPOSURE TO POTENTIAL INFECTION: ICD-10-CM

## 2023-09-14 PROCEDURE — 3074F SYST BP LT 130 MM HG: CPT | Performed by: INTERNAL MEDICINE

## 2023-09-14 PROCEDURE — 99212 OFFICE O/P EST SF 10 MIN: CPT | Performed by: INTERNAL MEDICINE

## 2023-09-14 PROCEDURE — 3078F DIAST BP <80 MM HG: CPT | Performed by: INTERNAL MEDICINE

## 2023-09-14 ASSESSMENT — FIBROSIS 4 INDEX: FIB4 SCORE: 1.5

## 2023-09-15 NOTE — PROGRESS NOTES
CC: Zora Mcneil is a 67 y.o. female is suffering from   Chief Complaint   Patient presents with    Results     6 weeks follow up         SUBJECTIVE:  1. Exposure to potential infection  Patient is here for follow-up is doing incredibly well, no major complaints today will be traveling to [place] for 1 month traveling throughout various countries.  We have discussed given her history of deep vein thrombosis that she cannot use retroviral therapy.        Past social, family, history:   Social History     Tobacco Use    Smoking status: Former     Current packs/day: 0.00     Average packs/day: 1 pack/day for 25.0 years (25.0 ttl pk-yrs)     Types: Cigarettes     Start date: 1955     Quit date: 1980     Years since quittin.7    Smokeless tobacco: Never   Vaping Use    Vaping Use: Never used   Substance Use Topics    Alcohol use: Yes     Comment: 2 per week    Drug use: Yes     Comment: Occassional marijuana edible         MEDICATIONS:    Current Outpatient Medications:     apixaban (ELIQUIS) 5mg Tab, Take 1 Tablet by mouth 2 times a day for 360 days., Disp: 180 Tablet, Rfl: 3    Semaglutide,0.25 or 0.5MG/DOS, 2 MG/3ML Solution Pen-injector, Inject 0.5 mg under the skin every 7 days., Disp: 3 mL, Rfl: 11    Mometasone Furoate 100 MCG/ACT Aerosol, Inhale 1 Puff 2 times a day. (Patient not taking: Reported on 2023), Disp: 1 Each, Rfl: 0    albuterol 108 (90 Base) MCG/ACT Aero Soln inhalation aerosol, Inhale 2 Puffs every 6 hours as needed for Shortness of Breath. (Patient not taking: Reported on 2023), Disp: 8.5 g, Rfl: 11    PROBLEMS:  Patient Active Problem List    Diagnosis Date Noted    Thyroid nodule 2023    Chronic cough 2023    Pulmonary nodules 2023    Pulmonary embolism, bilateral (HCC) 2023    Right leg DVT (HCC) 2023    Arthritis of left knee 10/13/2022    Osteoarthritis, knee 2022    Closed fracture of upper end of right radius with  "routine healing 07/06/2021    Carpal tunnel syndrome on right 05/03/2017    Left leg DVT (HCC) 04/18/2014    Generalized osteoarthrosis of hand 01/27/2014    Overactive bladder 12/04/2012    Hyperlipidemia     Depression     Obesity        REVIEW OF SYSTEMS:  Gen.:  No Nausea, Vomiting, fever, Chills.  Heart: No chest pain.  Lungs:  No shortness of Breath.  Psychological: Craig unusual Anxiety depression     PHYSICAL EXAM   Constitutional: Alert, cooperative, not in acute distress.  Cardiovascular:  Rate Rhythm is regular without murmurs rubs clicks.     Thorax & Lungs: Clear to auscultation, no wheezing, rhonchi, or rales  HENT: Normocephalic, Atraumatic.  Eyes: PERRLA, EOMI, Conjunctiva normal.   Neck: Trachia is midline no swelling of the thyroid.   Lymphatic: No lymphadenopathy noted.   Neurologic: Alert & oriented x 3, cranial nerves II through XII are intact, Normal motor function, Normal sensory function, No focal deficits noted.   Psychiatric: Affect normal, Judgment normal, Mood normal.     VITAL SIGNS:/62   Pulse 68   Temp 36.4 °C (97.6 °F) (Temporal)   Resp 16   Ht 1.676 m (5' 6\")   Wt 81.2 kg (179 lb)   SpO2 97%   BMI 28.89 kg/m²     Labs: Reviewed    Assessment:                                                     Plan:    1. Exposure to potential infection  Patient and I have discussed given her multiple medical issues that she will need to seek should she come down with a COVID infection care locally in [place] where she will be traveling for 1 month.         "

## 2023-10-31 ENCOUNTER — HOSPITAL ENCOUNTER (OUTPATIENT)
Dept: RADIOLOGY | Facility: MEDICAL CENTER | Age: 68
End: 2023-10-31
Attending: INTERNAL MEDICINE
Payer: MEDICARE

## 2023-10-31 DIAGNOSIS — R93.89 ABNORMAL CHEST CT: ICD-10-CM

## 2023-10-31 PROCEDURE — 71250 CT THORAX DX C-: CPT

## 2023-12-05 ENCOUNTER — PATIENT MESSAGE (OUTPATIENT)
Dept: MEDICAL GROUP | Facility: LAB | Age: 68
End: 2023-12-05
Payer: MEDICARE

## 2023-12-05 DIAGNOSIS — E66.3 OVERWEIGHT (BMI 25.0-29.9): ICD-10-CM

## 2023-12-05 NOTE — PATIENT COMMUNICATION
Received request via: Patient    Was the patient seen in the last year in this department? Yes    Does the patient have an active prescription (recently filled or refills available) for medication(s) requested? No    Does the patient have [facility] and need 100 day supply (blood pressure, diabetes and cholesterol meds only)? Patient does not have SCP     Last office visit 09/14/2023  Last labs 08/08/2023

## 2023-12-15 ENCOUNTER — HOSPITAL ENCOUNTER (OUTPATIENT)
Dept: LAB | Facility: MEDICAL CENTER | Age: 68
End: 2023-12-15
Payer: MEDICARE

## 2023-12-15 DIAGNOSIS — I82.401 ACUTE DEEP VEIN THROMBOSIS (DVT) OF RIGHT LOWER EXTREMITY, UNSPECIFIED VEIN (HCC): ICD-10-CM

## 2023-12-15 DIAGNOSIS — I26.99 PULMONARY EMBOLISM, BILATERAL (HCC): ICD-10-CM

## 2023-12-15 LAB
ALBUMIN SERPL BCP-MCNC: 4.2 G/DL (ref 3.2–4.9)
ALBUMIN/GLOB SERPL: 1.4 G/DL
ALP SERPL-CCNC: 76 U/L (ref 30–99)
ALT SERPL-CCNC: 19 U/L (ref 2–50)
ANION GAP SERPL CALC-SCNC: 9 MMOL/L (ref 7–16)
AST SERPL-CCNC: 18 U/L (ref 12–45)
BILIRUB SERPL-MCNC: 0.7 MG/DL (ref 0.1–1.5)
BUN SERPL-MCNC: 21 MG/DL (ref 8–22)
CALCIUM ALBUM COR SERPL-MCNC: 9.2 MG/DL (ref 8.5–10.5)
CALCIUM SERPL-MCNC: 9.4 MG/DL (ref 8.5–10.5)
CHLORIDE SERPL-SCNC: 104 MMOL/L (ref 96–112)
CO2 SERPL-SCNC: 25 MMOL/L (ref 20–33)
CREAT SERPL-MCNC: 0.66 MG/DL (ref 0.5–1.4)
ERYTHROCYTE [DISTWIDTH] IN BLOOD BY AUTOMATED COUNT: 41.9 FL (ref 35.9–50)
FASTING STATUS PATIENT QL REPORTED: NORMAL
GFR SERPLBLD CREATININE-BSD FMLA CKD-EPI: 94 ML/MIN/1.73 M 2
GLOBULIN SER CALC-MCNC: 3 G/DL (ref 1.9–3.5)
GLUCOSE SERPL-MCNC: 90 MG/DL (ref 65–99)
HCT VFR BLD AUTO: 37.9 % (ref 37–47)
HGB BLD-MCNC: 12.4 G/DL (ref 12–16)
MCH RBC QN AUTO: 29.5 PG (ref 27–33)
MCHC RBC AUTO-ENTMCNC: 32.7 G/DL (ref 32.2–35.5)
MCV RBC AUTO: 90 FL (ref 81.4–97.8)
PLATELET # BLD AUTO: 243 K/UL (ref 164–446)
PMV BLD AUTO: 10.9 FL (ref 9–12.9)
POTASSIUM SERPL-SCNC: 4.4 MMOL/L (ref 3.6–5.5)
PROT SERPL-MCNC: 7.2 G/DL (ref 6–8.2)
RBC # BLD AUTO: 4.21 M/UL (ref 4.2–5.4)
SODIUM SERPL-SCNC: 138 MMOL/L (ref 135–145)
WBC # BLD AUTO: 4.4 K/UL (ref 4.8–10.8)

## 2023-12-15 PROCEDURE — 80053 COMPREHEN METABOLIC PANEL: CPT

## 2023-12-15 PROCEDURE — 36415 COLL VENOUS BLD VENIPUNCTURE: CPT

## 2023-12-15 PROCEDURE — 85027 COMPLETE CBC AUTOMATED: CPT

## 2023-12-19 ENCOUNTER — ANTICOAGULATION VISIT (OUTPATIENT)
Dept: VASCULAR LAB | Facility: MEDICAL CENTER | Age: 68
End: 2023-12-19
Attending: INTERNAL MEDICINE
Payer: MEDICARE

## 2023-12-19 VITALS — HEART RATE: 69 BPM | DIASTOLIC BLOOD PRESSURE: 81 MMHG | SYSTOLIC BLOOD PRESSURE: 126 MMHG

## 2023-12-19 DIAGNOSIS — I26.99 PULMONARY EMBOLISM, BILATERAL (HCC): ICD-10-CM

## 2023-12-19 DIAGNOSIS — I82.401 ACUTE DEEP VEIN THROMBOSIS (DVT) OF RIGHT LOWER EXTREMITY, UNSPECIFIED VEIN (HCC): ICD-10-CM

## 2023-12-19 PROCEDURE — 99212 OFFICE O/P EST SF 10 MIN: CPT

## 2023-12-19 NOTE — PROGRESS NOTES
Target end date: Indefinite  Indication: Recurrent VTE  Drug: Eliquis 5 mg BID  CHADsVASC = n/a  HAS-BLED = 1 (age)    Health Status Since Last Assessment  Pt denies any new relevant medical problems, ED visits or hospitalizations  Pt denies any embolic events (stroke/tia/systemic embolism)    Adherence with DOAC Therapy  Pt has not missed doses in the average week.    Bleeding Risk Assessment  Pt denies epistaxis  Pt denies any hematuria  Pt denies any excessive or unusual bleeding/hematomas.  Pt denies any GI bleeds or hematemesis.  Pt denies any concerning daily headache or subdural hematoma symptoms.    Latest Hemoglobin: WNL  Hemoglobin   Date Value Ref Range Status   12/15/2023 12.4 12.0 - 16.0 g/dL Final     Latest Platelets: WNL  Platelet Count   Date Value Ref Range Status   12/15/2023 243 164 - 446 K/uL Final     Pt denies  ETOH overuse     Creatinine Clearance/Renal Function  Latest CrCl: >100 ml/min  Age < 70, Wt > 60 kg, Scr < 1.5 mg/dl    Hepatic function  Latest LFTs: WNL  Pt denies any history of liver dysfunction      Drug Interactions  ASA/other antiplatelets: None  NSAID: None  Other drug interactions: None  Verified no anticonvulsant or azole therapy, education provided for future use.     Examination  Blood Pressure  Slightly elevated however reasonable; pt was talking while BP was being checked  Vitals:    12/19/23 1408   BP: 126/81   Pulse: 69   Symptomatic hypotension: Denies   Significant gait impairment/imbalance/high risk for falls? No issues    Final Assessment and Recommendations:  Patient appears stable from the anticoagulation standpoint.    Benefits of continued DOAC therapy outweigh risks for this patient  Recommend pt continue with current DOAC therapy: Eliquis 5 mg BID  DOAC is affordable     Other Actions: CMP/CBC hemogram ordered prior to next visit    Follow up:  Will follow up with patient 1 year    Khushi Leung, KaliaD, BCACP

## 2024-03-14 ENCOUNTER — OFFICE VISIT (OUTPATIENT)
Dept: MEDICAL GROUP | Facility: LAB | Age: 69
End: 2024-03-14
Payer: MEDICARE

## 2024-03-14 VITALS
TEMPERATURE: 96.9 F | OXYGEN SATURATION: 99 % | RESPIRATION RATE: 14 BRPM | HEART RATE: 67 BPM | WEIGHT: 185 LBS | HEIGHT: 66 IN | DIASTOLIC BLOOD PRESSURE: 68 MMHG | BODY MASS INDEX: 29.73 KG/M2 | SYSTOLIC BLOOD PRESSURE: 108 MMHG

## 2024-03-14 DIAGNOSIS — R89.9 ABNORMAL LABORATORY TEST: ICD-10-CM

## 2024-03-14 DIAGNOSIS — E78.5 DYSLIPIDEMIA: ICD-10-CM

## 2024-03-14 PROCEDURE — 3074F SYST BP LT 130 MM HG: CPT | Performed by: INTERNAL MEDICINE

## 2024-03-14 PROCEDURE — 3078F DIAST BP <80 MM HG: CPT | Performed by: INTERNAL MEDICINE

## 2024-03-14 PROCEDURE — 99213 OFFICE O/P EST LOW 20 MIN: CPT | Performed by: INTERNAL MEDICINE

## 2024-03-14 ASSESSMENT — FIBROSIS 4 INDEX: FIB4 SCORE: 1.19

## 2024-03-15 NOTE — PROGRESS NOTES
CC: Zora Mcneil is a 68 y.o. female is suffering from   Chief Complaint   Patient presents with    Follow-Up         SUBJECTIVE:  1. Abnormal laboratory test  Patient is here for follow-up has a history of mildly abnormal CBC comprehensive metabolic panel will recheck in 6 months    2. Dyslipidemia  History of dyslipidemia clinically stable        Past social, family, history:   Social History     Tobacco Use    Smoking status: Former     Current packs/day: 0.00     Average packs/day: 1 pack/day for 25.0 years (25.0 ttl pk-yrs)     Types: Cigarettes     Start date: 1955     Quit date: 1980     Years since quittin.2    Smokeless tobacco: Never   Vaping Use    Vaping Use: Never used   Substance Use Topics    Alcohol use: Yes     Comment: 2 per week    Drug use: Yes     Comment: Occassional marijuana edible         MEDICATIONS:    Current Outpatient Medications:     Semaglutide, 1 MG/DOSE, 4 MG/3ML Solution Pen-injector, Inject 1 mg under the skin every 7 days., Disp: 3 mL, Rfl: 2    apixaban (ELIQUIS) 5mg Tab, Take 1 Tablet by mouth 2 times a day for 360 days., Disp: 180 Tablet, Rfl: 3    PROBLEMS:  Patient Active Problem List    Diagnosis Date Noted    Thyroid nodule 2023    Chronic cough 2023    Pulmonary nodules 2023    Pulmonary embolism, bilateral (HCC) 2023    Right leg DVT (HCC) 2023    Arthritis of left knee 10/13/2022    Osteoarthritis, knee 2022    Closed fracture of upper end of right radius with routine healing 2021    Carpal tunnel syndrome on right 2017    Left leg DVT (HCC) 2014    Generalized osteoarthrosis of hand 2014    Overactive bladder 2012    Hyperlipidemia     Depression     Obesity        REVIEW OF SYSTEMS:  Gen.:  No Nausea, Vomiting, fever, Chills.  Heart: No chest pain.  Lungs:  No shortness of Breath.  Psychological: Craig unusual Anxiety depression     PHYSICAL EXAM   Constitutional: Alert,  "cooperative, not in acute distress.  Cardiovascular:  Rate Rhythm is regular without murmurs rubs clicks.     Thorax & Lungs: Clear to auscultation, no wheezing, rhonchi, or rales  HENT: Normocephalic, Atraumatic.  Eyes: PERRLA, EOMI, Conjunctiva normal.   Neck: Trachia is midline no swelling of the thyroid.   Lymphatic: No lymphadenopathy noted.   Neurologic: Alert & oriented x 3, cranial nerves II through XII are intact, Normal motor function, Normal sensory function, No focal deficits noted.   Psychiatric: Affect normal, Judgment normal, Mood normal.     VITAL SIGNS:/68 (BP Location: Left arm, Patient Position: Sitting, BP Cuff Size: Adult)   Pulse 67   Temp 36.1 °C (96.9 °F)   Resp 14   Ht 1.676 m (5' 6\")   Wt 83.9 kg (185 lb)   SpO2 99%   BMI 29.86 kg/m²     Labs: Reviewed    Assessment:                                                     Plan:    1. Abnormal laboratory test  Recheck CBC comprehensive metabolic panel  - Comp Metabolic Panel; Future  - CBC WITH DIFFERENTIAL; Future    2. Dyslipidemia  Recheck lipid profile  - Lipid Profile; Future        "

## 2024-03-19 DIAGNOSIS — I82.401 ACUTE DEEP VEIN THROMBOSIS (DVT) OF RIGHT LOWER EXTREMITY, UNSPECIFIED VEIN (HCC): ICD-10-CM

## 2024-08-08 ENCOUNTER — PATIENT MESSAGE (OUTPATIENT)
Dept: MEDICAL GROUP | Facility: LAB | Age: 69
End: 2024-08-08
Payer: MEDICARE

## 2024-08-08 DIAGNOSIS — I26.99 PULMONARY EMBOLISM, BILATERAL (HCC): ICD-10-CM

## 2024-09-26 ENCOUNTER — HOSPITAL ENCOUNTER (OUTPATIENT)
Dept: LAB | Facility: MEDICAL CENTER | Age: 69
End: 2024-09-26
Attending: INTERNAL MEDICINE
Payer: MEDICARE

## 2024-09-26 DIAGNOSIS — R89.9 ABNORMAL LABORATORY TEST: ICD-10-CM

## 2024-09-26 DIAGNOSIS — E78.5 DYSLIPIDEMIA: ICD-10-CM

## 2024-09-26 LAB
ALBUMIN SERPL BCP-MCNC: 4 G/DL (ref 3.2–4.9)
ALBUMIN/GLOB SERPL: 1.5 G/DL
ALP SERPL-CCNC: 80 U/L (ref 30–99)
ALT SERPL-CCNC: 19 U/L (ref 2–50)
ANION GAP SERPL CALC-SCNC: 13 MMOL/L (ref 7–16)
AST SERPL-CCNC: 25 U/L (ref 12–45)
BASOPHILS # BLD AUTO: 0.2 % (ref 0–1.8)
BASOPHILS # BLD: 0.01 K/UL (ref 0–0.12)
BILIRUB SERPL-MCNC: 0.4 MG/DL (ref 0.1–1.5)
BUN SERPL-MCNC: 18 MG/DL (ref 8–22)
CALCIUM ALBUM COR SERPL-MCNC: 9.4 MG/DL (ref 8.5–10.5)
CALCIUM SERPL-MCNC: 9.4 MG/DL (ref 8.5–10.5)
CHLORIDE SERPL-SCNC: 105 MMOL/L (ref 96–112)
CHOLEST SERPL-MCNC: 190 MG/DL (ref 100–199)
CO2 SERPL-SCNC: 24 MMOL/L (ref 20–33)
CREAT SERPL-MCNC: 0.72 MG/DL (ref 0.5–1.4)
EOSINOPHIL # BLD AUTO: 0.08 K/UL (ref 0–0.51)
EOSINOPHIL NFR BLD: 1.9 % (ref 0–6.9)
ERYTHROCYTE [DISTWIDTH] IN BLOOD BY AUTOMATED COUNT: 45.5 FL (ref 35.9–50)
FASTING STATUS PATIENT QL REPORTED: NORMAL
GFR SERPLBLD CREATININE-BSD FMLA CKD-EPI: 89 ML/MIN/1.73 M 2
GLOBULIN SER CALC-MCNC: 2.7 G/DL (ref 1.9–3.5)
GLUCOSE SERPL-MCNC: 89 MG/DL (ref 65–99)
HCT VFR BLD AUTO: 34.7 % (ref 37–47)
HDLC SERPL-MCNC: 48 MG/DL
HGB BLD-MCNC: 11.6 G/DL (ref 12–16)
IMM GRANULOCYTES # BLD AUTO: 0.02 K/UL (ref 0–0.11)
IMM GRANULOCYTES NFR BLD AUTO: 0.5 % (ref 0–0.9)
LDLC SERPL CALC-MCNC: 129 MG/DL
LYMPHOCYTES # BLD AUTO: 1.13 K/UL (ref 1–4.8)
LYMPHOCYTES NFR BLD: 27.5 % (ref 22–41)
MCH RBC QN AUTO: 30.1 PG (ref 27–33)
MCHC RBC AUTO-ENTMCNC: 33.4 G/DL (ref 32.2–35.5)
MCV RBC AUTO: 90.1 FL (ref 81.4–97.8)
MONOCYTES # BLD AUTO: 0.33 K/UL (ref 0–0.85)
MONOCYTES NFR BLD AUTO: 8 % (ref 0–13.4)
NEUTROPHILS # BLD AUTO: 2.54 K/UL (ref 1.82–7.42)
NEUTROPHILS NFR BLD: 61.9 % (ref 44–72)
NRBC # BLD AUTO: 0 K/UL
NRBC BLD-RTO: 0 /100 WBC (ref 0–0.2)
PLATELET # BLD AUTO: 238 K/UL (ref 164–446)
PMV BLD AUTO: 10.6 FL (ref 9–12.9)
POTASSIUM SERPL-SCNC: 4.4 MMOL/L (ref 3.6–5.5)
PROT SERPL-MCNC: 6.7 G/DL (ref 6–8.2)
RBC # BLD AUTO: 3.85 M/UL (ref 4.2–5.4)
SODIUM SERPL-SCNC: 142 MMOL/L (ref 135–145)
TRIGL SERPL-MCNC: 64 MG/DL (ref 0–149)
WBC # BLD AUTO: 4.1 K/UL (ref 4.8–10.8)

## 2024-09-26 PROCEDURE — 36415 COLL VENOUS BLD VENIPUNCTURE: CPT

## 2024-09-26 PROCEDURE — 80053 COMPREHEN METABOLIC PANEL: CPT

## 2024-09-26 PROCEDURE — 80061 LIPID PANEL: CPT

## 2024-09-26 PROCEDURE — 85025 COMPLETE CBC W/AUTO DIFF WBC: CPT

## 2024-09-27 ENCOUNTER — OFFICE VISIT (OUTPATIENT)
Dept: MEDICAL GROUP | Facility: LAB | Age: 69
End: 2024-09-27
Payer: MEDICARE

## 2024-09-27 VITALS
RESPIRATION RATE: 16 BRPM | TEMPERATURE: 97.1 F | HEIGHT: 66 IN | WEIGHT: 170 LBS | HEART RATE: 70 BPM | BODY MASS INDEX: 27.32 KG/M2 | DIASTOLIC BLOOD PRESSURE: 60 MMHG | OXYGEN SATURATION: 97 % | SYSTOLIC BLOOD PRESSURE: 106 MMHG

## 2024-09-27 DIAGNOSIS — D50.9 IRON DEFICIENCY ANEMIA, UNSPECIFIED IRON DEFICIENCY ANEMIA TYPE: ICD-10-CM

## 2024-09-27 DIAGNOSIS — Z79.01 ANTICOAGULANT LONG-TERM USE: ICD-10-CM

## 2024-09-27 PROCEDURE — 99213 OFFICE O/P EST LOW 20 MIN: CPT | Performed by: INTERNAL MEDICINE

## 2024-09-27 PROCEDURE — 3074F SYST BP LT 130 MM HG: CPT | Performed by: INTERNAL MEDICINE

## 2024-09-27 PROCEDURE — 3078F DIAST BP <80 MM HG: CPT | Performed by: INTERNAL MEDICINE

## 2024-09-27 ASSESSMENT — FIBROSIS 4 INDEX: FIB4 SCORE: 1.69

## 2024-09-27 ASSESSMENT — PATIENT HEALTH QUESTIONNAIRE - PHQ9: CLINICAL INTERPRETATION OF PHQ2 SCORE: 0

## 2024-09-27 NOTE — PROGRESS NOTES
CC: Zora Mcneil is a 68 y.o. female is suffering from   Chief Complaint   Patient presents with    Follow-Up         SUBJECTIVE:  1. Iron deficiency anemia, unspecified iron deficiency anemia type  Patient is here for a follow-up appointment patient was noted to be anemic, I am concerned that this may be anemia secondary to the use of Eliquis    2. Anticoagulant long-term use  Continue Eliquis encourage patient avoid any nonsteroidal anti-inflammatory medications        Past social, family, history: ,  is present  Social History     Tobacco Use    Smoking status: Former     Current packs/day: 0.00     Average packs/day: 1 pack/day for 25.0 years (25.0 ttl pk-yrs)     Types: Cigarettes     Start date: 1955     Quit date: 1980     Years since quittin.7    Smokeless tobacco: Never   Vaping Use    Vaping status: Never Used   Substance Use Topics    Alcohol use: Yes     Comment: 2 per week    Drug use: Yes     Comment: Occassional marijuana edible         MEDICATIONS:    Current Outpatient Medications:     apixaban (ELIQUIS) 5mg Tab, Take 1 Tablet by mouth 2 times a day for 360 days., Disp: 180 Tablet, Rfl: 3    Semaglutide, 1 MG/DOSE, 4 MG/3ML Solution Pen-injector, Inject 1 mg under the skin every 7 days. (Patient not taking: Reported on 2024), Disp: 3 mL, Rfl: 2    PROBLEMS:  Patient Active Problem List    Diagnosis Date Noted    Anticoagulant long-term use 2024    Thyroid nodule 2023    Chronic cough 2023    Pulmonary nodules 2023    Pulmonary embolism, bilateral (HCC) 2023    Right leg DVT (HCC) 2023    Arthritis of left knee 10/13/2022    Osteoarthritis, knee 2022    Closed fracture of upper end of right radius with routine healing 2021    Carpal tunnel syndrome on right 2017    Left leg DVT (HCC) 2014    Generalized osteoarthrosis of hand 2014    Overactive bladder 2012    Hyperlipidemia      "Depression     Obesity        REVIEW OF SYSTEMS:  Gen.:  No Nausea, Vomiting, fever, Chills.  Heart: No chest pain.  Lungs:  No shortness of Breath.  Psychological: Craig unusual Anxiety depression     PHYSICAL EXAM      Constitutional: Alert, cooperative, not in acute distress.  Cardiovascular:  Rate Rhythm is regular without murmurs rubs clicks.     Thorax & Lungs: Clear to auscultation, no wheezing, rhonchi, or rales  HENT: Normocephalic, Atraumatic.  Eyes: PERRLA, EOMI, Conjunctiva normal.   Neck: Trachia is midline no swelling of the thyroid.   Lymphatic: No lymphadenopathy noted.   Neurologic: Alert & oriented x 3, cranial nerves II through XII are intact, Normal motor function, Normal sensory function, No focal deficits noted.   Psychiatric: Affect normal, Judgment normal, Mood normal.     VITAL SIGNS:/60   Pulse 70   Temp 36.2 °C (97.1 °F)   Resp 16   Ht 1.676 m (5' 6\")   Wt 77.1 kg (170 lb)   SpO2 97%   BMI 27.44 kg/m²     Labs: Reviewed    Assessment:                                                     Plan:     1. Iron deficiency anemia, unspecified iron deficiency anemia type  Recheck CBC in 6 weeks check iron iron-binding capacity  - CBC WITH DIFFERENTIAL; Future  - IRON/TOTAL IRON BIND; Future    2. Anticoagulant long-term use  Continued use of Eliquis because of a history of deep vein thrombosis pulmonary emboli        "

## 2024-11-05 ENCOUNTER — HOSPITAL ENCOUNTER (OUTPATIENT)
Dept: LAB | Facility: MEDICAL CENTER | Age: 69
End: 2024-11-05
Attending: INTERNAL MEDICINE
Payer: MEDICARE

## 2024-11-05 DIAGNOSIS — D50.9 IRON DEFICIENCY ANEMIA, UNSPECIFIED IRON DEFICIENCY ANEMIA TYPE: ICD-10-CM

## 2024-11-05 LAB
BASOPHILS # BLD AUTO: 0.7 % (ref 0–1.8)
BASOPHILS # BLD: 0.03 K/UL (ref 0–0.12)
EOSINOPHIL # BLD AUTO: 0.22 K/UL (ref 0–0.51)
EOSINOPHIL NFR BLD: 5.4 % (ref 0–6.9)
ERYTHROCYTE [DISTWIDTH] IN BLOOD BY AUTOMATED COUNT: 42.5 FL (ref 35.9–50)
HCT VFR BLD AUTO: 38 % (ref 37–47)
HGB BLD-MCNC: 12.9 G/DL (ref 12–16)
IMM GRANULOCYTES # BLD AUTO: 0.03 K/UL (ref 0–0.11)
IMM GRANULOCYTES NFR BLD AUTO: 0.7 % (ref 0–0.9)
LYMPHOCYTES # BLD AUTO: 1.52 K/UL (ref 1–4.8)
LYMPHOCYTES NFR BLD: 37.3 % (ref 22–41)
MCH RBC QN AUTO: 30.6 PG (ref 27–33)
MCHC RBC AUTO-ENTMCNC: 33.9 G/DL (ref 32.2–35.5)
MCV RBC AUTO: 90.3 FL (ref 81.4–97.8)
MONOCYTES # BLD AUTO: 0.45 K/UL (ref 0–0.85)
MONOCYTES NFR BLD AUTO: 11 % (ref 0–13.4)
NEUTROPHILS # BLD AUTO: 1.83 K/UL (ref 1.82–7.42)
NEUTROPHILS NFR BLD: 44.9 % (ref 44–72)
NRBC # BLD AUTO: 0 K/UL
NRBC BLD-RTO: 0 /100 WBC (ref 0–0.2)
PLATELET # BLD AUTO: 253 K/UL (ref 164–446)
PMV BLD AUTO: 10.8 FL (ref 9–12.9)
RBC # BLD AUTO: 4.21 M/UL (ref 4.2–5.4)
WBC # BLD AUTO: 4.1 K/UL (ref 4.8–10.8)

## 2024-11-05 PROCEDURE — 83550 IRON BINDING TEST: CPT

## 2024-11-05 PROCEDURE — 83540 ASSAY OF IRON: CPT

## 2024-11-05 PROCEDURE — 85025 COMPLETE CBC W/AUTO DIFF WBC: CPT

## 2024-11-05 PROCEDURE — 36415 COLL VENOUS BLD VENIPUNCTURE: CPT

## 2024-11-06 LAB
IRON SATN MFR SERPL: 33 % (ref 15–55)
IRON SERPL-MCNC: 89 UG/DL (ref 40–170)
TIBC SERPL-MCNC: 273 UG/DL (ref 250–450)
UIBC SERPL-MCNC: 184 UG/DL (ref 110–370)

## 2024-11-07 ENCOUNTER — OFFICE VISIT (OUTPATIENT)
Dept: MEDICAL GROUP | Facility: LAB | Age: 69
End: 2024-11-07
Payer: MEDICARE

## 2024-11-07 VITALS
SYSTOLIC BLOOD PRESSURE: 106 MMHG | DIASTOLIC BLOOD PRESSURE: 60 MMHG | WEIGHT: 166.2 LBS | HEIGHT: 66 IN | HEART RATE: 74 BPM | RESPIRATION RATE: 14 BRPM | OXYGEN SATURATION: 96 % | BODY MASS INDEX: 26.71 KG/M2 | TEMPERATURE: 97.8 F

## 2024-11-07 DIAGNOSIS — Z12.31 SCREENING MAMMOGRAM FOR BREAST CANCER: ICD-10-CM

## 2024-11-07 DIAGNOSIS — I26.99 PULMONARY EMBOLISM, BILATERAL (HCC): ICD-10-CM

## 2024-11-07 DIAGNOSIS — M25.562 ACUTE PAIN OF LEFT KNEE: ICD-10-CM

## 2024-11-07 PROCEDURE — 3078F DIAST BP <80 MM HG: CPT | Performed by: INTERNAL MEDICINE

## 2024-11-07 PROCEDURE — 99214 OFFICE O/P EST MOD 30 MIN: CPT | Performed by: INTERNAL MEDICINE

## 2024-11-07 PROCEDURE — 3074F SYST BP LT 130 MM HG: CPT | Performed by: INTERNAL MEDICINE

## 2024-11-07 ASSESSMENT — ENCOUNTER SYMPTOMS: GENERAL WELL-BEING: GOOD

## 2024-11-07 ASSESSMENT — FIBROSIS 4 INDEX: FIB4 SCORE: 1.61

## 2024-11-07 ASSESSMENT — ACTIVITIES OF DAILY LIVING (ADL): BATHING_REQUIRES_ASSISTANCE: 0

## 2024-11-07 NOTE — PROGRESS NOTES
CC: Zora Mcneil is a 69 y.o. female is suffering from   Chief Complaint   Patient presents with    Annual Wellness Visit         SUBJECTIVE:  1. Pulmonary embolism, bilateral (HCC)  Patient is here for follow-up has a history of pulmonary embolism, is on Eliquis after a history of deep vein thrombosis x 3    2. Screening mammogram for breast cancer  Screening mammogram ordered    3. Acute pain of left knee  Left knee pain after knee plain replacement referral to Rick Portillo upon request        Past social, family, history:   Social History     Tobacco Use    Smoking status: Former     Current packs/day: 0.00     Average packs/day: 1 pack/day for 25.0 years (25.0 ttl pk-yrs)     Types: Cigarettes     Start date: 1955     Quit date: 1980     Years since quittin.8    Smokeless tobacco: Never   Vaping Use    Vaping status: Never Used   Substance Use Topics    Alcohol use: Yes     Comment: 2 per week    Drug use: Yes     Comment: Occassional marijuana edible         MEDICATIONS:    Current Outpatient Medications:     apixaban (ELIQUIS) 5mg Tab, Take 1 Tablet by mouth 2 times a day for 30 days., Disp: 60 Tablet, Rfl: 0    Semaglutide, 1 MG/DOSE, 4 MG/3ML Solution Pen-injector, Inject 1 mg under the skin every 7 days. (Patient not taking: Reported on 2024), Disp: 3 mL, Rfl: 2    PROBLEMS:  Patient Active Problem List    Diagnosis Date Noted    Anticoagulant long-term use 2024    Thyroid nodule 2023    Chronic cough 2023    Pulmonary nodules 2023    Pulmonary embolism, bilateral (HCC) 2023    Right leg DVT (HCC) 2023    Arthritis of left knee 10/13/2022    Osteoarthritis, knee 2022    Closed fracture of upper end of right radius with routine healing 2021    Carpal tunnel syndrome on right 2017    Left leg DVT (HCC) 2014    Generalized osteoarthrosis of hand 2014    Overactive bladder 2012    Hyperlipidemia      "Depression     Obesity        REVIEW OF SYSTEMS:  Gen.:  No Nausea, Vomiting, fever, Chills.  Heart: No chest pain.  Lungs:  No shortness of Breath.  Psychological: Craig unusual Anxiety depression     PHYSICAL EXAM      Constitutional: Alert, cooperative, not in acute distress.  Cardiovascular:  Rate Rhythm is regular without murmurs rubs clicks.     Thorax & Lungs: Clear to auscultation, no wheezing, rhonchi, or rales  HENT: Normocephalic, Atraumatic.  Eyes: PERRLA, EOMI, Conjunctiva normal.   Neck: Trachia is midline no swelling of the thyroid.   Lymphatic: No lymphadenopathy noted.   Musculoskeletal: No change in pain with booking the knee medially laterally  Neurologic: Alert & oriented x 3, cranial nerves II through XII are intact, Normal motor function, Normal sensory function, No focal deficits noted.   Psychiatric: Affect normal, Judgment normal, Mood normal.     VITAL SIGNS:/60 (BP Location: Left arm, Patient Position: Sitting, BP Cuff Size: Adult)   Pulse 74   Temp 36.6 °C (97.8 °F) (Temporal)   Resp 14   Ht 1.676 m (5' 6\")   Wt 75.4 kg (166 lb 3.2 oz)   SpO2 96%   BMI 26.83 kg/m²     Labs: Reviewed    Assessment:                                                     Plan:     1. Pulmonary embolism, bilateral (HCC)  Continue Eliquis  - apixaban (ELIQUIS) 5mg Tab; Take 1 Tablet by mouth 2 times a day for 30 days.  Dispense: 60 Tablet; Refill: 0  - CBC WITH DIFFERENTIAL; Future    2. Screening mammogram for breast cancer  Screening mammogram ordered  - MA-SCREENING MAMMO BILAT W/TOMOSYNTHESIS W/CAD; Future    3. Acute pain of left knee  X-ray of the left knee ordered  - DX-KNEE 3 VIEWS Atraumatic Pain/Swelling/Deformity; Future      Skin ref to darvin lund upon request.  Paresh note.   "

## 2024-11-07 NOTE — PROGRESS NOTES
Chief Complaint   Patient presents with    Annual Wellness Visit       HPI:  Zora Mcneil is a 69 y.o. here for Medicare Annual Wellness Visit     Patient Active Problem List    Diagnosis Date Noted    Anticoagulant long-term use 09/27/2024    Thyroid nodule 07/08/2023    Chronic cough 04/16/2023    Pulmonary nodules 04/16/2023    Pulmonary embolism, bilateral (HCC) 03/16/2023    Right leg DVT (HCC) 03/16/2023    Arthritis of left knee 10/13/2022    Osteoarthritis, knee 06/14/2022    Closed fracture of upper end of right radius with routine healing 07/06/2021    Carpal tunnel syndrome on right 05/03/2017    Left leg DVT (HCC) 04/18/2014    Generalized osteoarthrosis of hand 01/27/2014    Overactive bladder 12/04/2012    Hyperlipidemia     Depression     Obesity        Current Outpatient Medications   Medication Sig Dispense Refill    apixaban (ELIQUIS) 5mg Tab Take 1 Tablet by mouth 2 times a day for 360 days. 180 Tablet 3    Semaglutide, 1 MG/DOSE, 4 MG/3ML Solution Pen-injector Inject 1 mg under the skin every 7 days. (Patient not taking: Reported on 9/27/2024) 3 mL 2     No current facility-administered medications for this visit.          Current supplements as per medication list.     Allergies: Patient has no known allergies.    Current social contact/activities:      She  reports that she quit smoking about 44 years ago. Her smoking use included cigarettes. She started smoking about 69 years ago. She has a 25 pack-year smoking history. She has never used smokeless tobacco. She reports current alcohol use. She reports current drug use.  Counseling given: Not Answered      ROS:    Gait: Uses no assistive device  Ostomy: No  Other tubes: No  Amputations: No  Chronic oxygen use: No  Last eye exam: unknown  Wears hearing aids: No   : yes     Screening:    Depression Screening  Little interest or pleasure in doing things?     Feeling down, depressed , or hopeless?    Trouble falling or staying asleep,  or sleeping too much?     Feeling tired or having little energy?     Poor appetite or overeating?     Feeling bad about yourself - or that you are a failure or have let yourself or your family down?    Trouble concentrating on things, such as reading the newspaper or watching television?    Moving or speaking so slowly that other people could have noticed.  Or the opposite - being so fidgety or restless that you have been moving around a lot more than usual?     Thoughts that you would be better off dead, or of hurting yourself?     Patient Health Questionnaire Score:      If depressive symptoms identified deferred to follow up visit unless specifically addressed in assessment and plan.    Interpretation of PHQ-9 Total Score   Score Severity   1-4 No Depression   5-9 Mild Depression   10-14 Moderate Depression   15-19 Moderately Severe Depression   20-27 Severe Depression    Screening for Cognitive Impairment  Do you or any of your friends or family members have any concern about your memory?    Three Minute Recall (Leader, Season, Table)  /3    Aman clock face with all 12 numbers and set the hands to show 10 minutes after 11.       Cognitive concerns identified deferred for follow up unless specifically addressed in assessment and plan.    Fall Risk Assessment  Has the patient had two or more falls in the last year or any fall with injury in the last year?  No    Safety Assessment  Do you always wear your seatbelt?     Any changes to home needed to function safely?    Difficulty hearing.     Patient counseled about all safety risks that were identified.    Functional Assessment ADLs  Are there any barriers preventing you from cooking for yourself or meeting nutritional needs?   .    Are there any barriers preventing you from driving safely or obtaining transportation?   .    Are there any barriers preventing you from using a telephone or calling for help?       Are there any barriers preventing you from shopping?    .    Are there any barriers preventing you from taking care of your own finances?       Are there any barriers preventing you from managing your medications?       Are there any barriers preventing you from showering, bathing or dressing yourself?      Are there any barriers preventing you from doing housework or laundry?      Are there any barriers preventing you from using the toilet?     Are you currently engaging in any exercise or physical activity?   .      Self-Assessment of Health  What is your perception of your health?      Do you sleep more than six hours a night?      In the past 7 days, how much did pain keep you from doing your normal work?      Do you spend quality time with family or friends (virtually or in person)?      Do you usually eat a heart healthy diet that constists of a variety of fruits, vegetables, whole grains and fiber?      Do you eat foods high in fat and/or Fast Food more than three times per week?      How concerned are you that your medical conditions are not being well managed?      Are you worried that in the next 2 months, you may not have stable housing that you own, rent, or stay in as part of a household?          Advance Care Planning  Do you have an Advance Directive, Living Will, Durable Power of , or POLST?                   Health Maintenance Summary            Overdue - Annual Wellness Visit (Yearly) Never done      No completion history exists for this topic.              Mammogram (Every 2 Years) Tentatively due on 4/25/2025 04/25/2023  MA-SCREENING MAMMO BILAT W/TOMOSYNTHESIS W/CAD    04/12/2017  MA-MAMMO SCREENING BILAT W/LUCILLE W/CAD    08/21/2013  MA-SCREEING MAMMOGRAM W/ CAD    01/04/2012  MA-SCREEING MAMMOGRAM W/ CAD    07/29/2008  MA-CAD SCREENING-MAMMO    Only the first 5 history entries have been loaded, but more history exists.              Bone Density Scan (Every 5 Years) Tentatively due on 1/20/2027 01/20/2022  DS-BONE DENSITY STUDY  (DEXA)              Colorectal Cancer Screening (Colonoscopy - Preferred) Next due on 11/12/2029 11/12/2019  AMB EXTERNAL COLONOSCOPY RESULTS              IMM DTaP/Tdap/Td Vaccine (3 - Td or Tdap) Next due on 3/9/2033      03/09/2023  Imm Admin: Tdap Vaccine    01/25/2012  Imm Admin: Tdap Vaccine              Hepatitis C Screening  Tentatively Complete      03/09/2017  Hepatitis C Antibody component of HEP C VIRUS ANTIBODY              Zoster (Shingles) Vaccines (Series Information) Completed      02/04/2020  Imm Admin: Zoster Vaccine Recombinant (RZV) (SHINGRIX)    11/25/2019  Imm Admin: Zoster Vaccine Recombinant (RZV) (SHINGRIX)    11/12/2013  Imm Admin: Zoster Vaccine Live (ZVL) (Zostavax) - HISTORICAL DATA              Pneumococcal Vaccine: 65+ Years (Series Information) Completed      03/09/2023  Imm Admin: Pneumococcal Conjugate Vaccine (PCV20)    11/25/2019  Imm Admin: Pneumococcal Conjugate Vaccine (Prevnar/PCV-13)              Influenza Vaccine (Series Information) Completed      10/23/2024  Outside Immunization: Influenza Tri, Adj    12/06/2022  Imm Admin: Influenza Vaccine Adult HD    09/09/2021  Imm Admin: Influenza Vaccine Adult HD    08/21/2020  Imm Admin: Influenza (IM) Preservative Free - HISTORICAL DATA    11/25/2019  Imm Admin: Influenza Vaccine Quad Inj (Pf)    Only the first 5 history entries have been loaded, but more history exists.              COVID-19 Vaccine (Series Information) Completed      10/23/2024  Imm Admin: COVID-19, mRNA, LNP-S, PF, garry-sucrose, 30 mcg/0.3 mL    12/06/2022  Imm Admin: PFIZER BIVALENT SARS-COV-2 VACCINE (12+)    04/13/2022  Imm Admin: PFIZER ESTEVES CAP SARS-COV-2 VACCINATION (12+)    10/05/2021  Imm Admin: PFIZER PURPLE CAP SARS-COV-2 VACCINATION (12+)    03/21/2021  Imm Admin: PFIZER PURPLE CAP SARS-COV-2 VACCINATION (12+)    Only the first 5 history entries have been loaded, but more history exists.              Hepatitis A Vaccine (Hep A) (Series  Information) Aged Out      No completion history exists for this topic.              Hepatitis B Vaccine (Hep B) (Series Information) Aged Out      No completion history exists for this topic.              HPV Vaccines (Series Information) Aged Out      No completion history exists for this topic.              Polio Vaccine (Inactivated Polio) (Series Information) Aged Out      No completion history exists for this topic.              Meningococcal Immunization (Series Information) Aged Out      No completion history exists for this topic.              Discontinued - Cervical Cancer Screening  Discontinued        Frequency changed to Never automatically (Topic No Longer Applies)    10/17/2011  PAP IG (IMAGE GUIDED)                    Patient Care Team:  Teo Sow D.O. as PCP - General (Internal Medicine)  [facility]  Zully Pike M.D. (Pulmonary Medicine)  Rick Portillo M.D. (Orthopedic Surgery)  Rosalva Perry M.D. (Dermatology)  Michael J Bloch, M.D. (Cardiovascular Disease (Cardiology))      Social History     Tobacco Use    Smoking status: Former     Current packs/day: 0.00     Average packs/day: 1 pack/day for 25.0 years (25.0 ttl pk-yrs)     Types: Cigarettes     Start date: 1955     Quit date: 1980     Years since quittin.8    Smokeless tobacco: Never   Vaping Use    Vaping status: Never Used   Substance Use Topics    Alcohol use: Yes     Comment: 2 per week    Drug use: Yes     Comment: Occassional marijuana edible     Family History   Problem Relation Age of Onset    Cancer Father         lung    Arthritis Mother         rheumatoid arthritis     She  has a past medical history of Arthritis, Blood clotting disorder (HCC) (), Bowel habit changes, Bronchitis, Chest tightness, Cold (2018), Cough, Dental disorder, Hyperlipidemia, Obesity, Pain (2018), Psychiatric problem, Shortness of breath, Sputum production, and Unspecified urinary  "incontinence.    She has no past medical history of Painful breathing or Wheezing.   Past Surgical History:   Procedure Laterality Date    PB TOTAL KNEE ARTHROPLASTY Left 10/13/2022    Procedure: LEFT TOTAL KNEE ARTHROPLASTY;  Surgeon: Rick Portillo M.D.;  Location: [facility];  Service: Orthopedics    KNEE ARTHROSCOPY Left 4/23/2018    Procedure: KNEE ARTHROSCOPY;  Surgeon: Tom Moore M.D.;  Location: SURGERY [facility];  Service: Orthopedics    MENISCECTOMY, KNEE, MEDIAL Left 4/23/2018    Procedure: MEDIAL MENISCECTOMY - PARTIAL;  Surgeon: Tom Moore M.D.;  Location: SURGERY [facility];  Service: Orthopedics    CARPAL TUNNEL ENDOSCOPIC Right 5/3/2017    Procedure: CARPAL TUNNEL ENDOSCOPIC;  Surgeon: Iván Edge M.D.;  Location: [facility];  Service:     LESION EXCISION ORTHO  1/27/2014    Performed by Fermín Edge M.D. at SURGERY SAME DAY [facility]    BONE SPUR EXCISION  11/11/2011    Performed by REGINE PERERA at SURGERY SAME DAY [facility]    TOE ARTHROPLASTY  11/11/2011    Performed by REGINE PERERA at SURGERY SAME DAY [facility]    COLONOSCOPY  2009,recheck 10    HEMORRHOIDECTOMY      KNEE ARTHROSCOPY      OTHER ORTHOPEDIC SURGERY      toe joint surgery       Exam:   /60 (BP Location: Left arm, Patient Position: Sitting, BP Cuff Size: Adult)   Pulse 74   Temp 36.6 °C (97.8 °F) (Temporal)   Resp 14   Ht 1.676 m (5' 6\")   Wt 75.4 kg (166 lb 3.2 oz)   SpO2 96%  Body mass index is 26.83 kg/m².    Hearing good.    Dentition not asked  Alert, oriented in no acute distress.  Eye contact is good, speech goal directed, affect calm    Assessment and Plan. The following treatment and monitoring plan is recommended:    1. Pulmonary embolism, bilateral (HCC)      Services suggested: No services needed at this time  Health Care Screening: Age-appropriate preventive services recommended by USPTF and ACIP covered by " Medicare were discussed today. Services ordered if indicated and agreed upon by the patient.  Referrals offered: Community-based lifestyle interventions to reduce health risks and promote self-management and wellness, fall prevention, nutrition, physical activity, tobacco-use cessation, weight loss, and mental health services as per orders if indicated.    Discussion today about general wellness and lifestyle habits:    Prevent falls and reduce trip hazards; Cautioned about securing or removing rugs.  Have a working fire alarm and carbon monoxide detector;   Engage in regular physical activity and social activities     Follow-up: No follow-ups on file.

## 2024-11-13 ASSESSMENT — PATIENT HEALTH QUESTIONNAIRE - PHQ9: CLINICAL INTERPRETATION OF PHQ2 SCORE: 0

## 2024-12-17 ENCOUNTER — ANTICOAGULATION VISIT (OUTPATIENT)
Dept: VASCULAR LAB | Facility: MEDICAL CENTER | Age: 69
End: 2024-12-17
Attending: INTERNAL MEDICINE
Payer: MEDICARE

## 2024-12-17 DIAGNOSIS — Z79.01 ANTICOAGULANT LONG-TERM USE: ICD-10-CM

## 2024-12-17 DIAGNOSIS — I82.4Y2 ACUTE DEEP VEIN THROMBOSIS (DVT) OF PROXIMAL VEIN OF LEFT LOWER EXTREMITY (HCC): ICD-10-CM

## 2024-12-17 DIAGNOSIS — I26.99 PULMONARY EMBOLISM, BILATERAL (HCC): ICD-10-CM

## 2024-12-17 DIAGNOSIS — I82.401 ACUTE DEEP VEIN THROMBOSIS (DVT) OF RIGHT LOWER EXTREMITY, UNSPECIFIED VEIN (HCC): ICD-10-CM

## 2024-12-17 PROCEDURE — 99212 OFFICE O/P EST SF 10 MIN: CPT

## 2024-12-17 NOTE — PROGRESS NOTES
Target end date: Indefinite  Indication: Recurrent VTE  Drug: Eliquis 5 mg BID  CHADsVASC = n/a  HAS-BLED = 1 (age)       [id number] Stroke Risk Points: N/A   Values used to calculate this score:    Points  Metrics       0        Has Congestive Heart Failure: No       0        Has Hypertension: No       1        Age: 69       0        Has Diabetes: No       0        Had Stroke: No                 Had TIA: No                 Had Thromboembolism: No       0        Has Vascular Disease: No       1        Clinically Relevant Sex: Female     No data recorded     Health Status Since Last Assessment  Pt denies any new relevant medical problems, ED visits or hospitalizations  Pt denies any embolic events (stroke/tia/systemic embolism)      Adherence with DOAC Therapy  Pt has not missed doses in the average week.  DOAC is affordable    Bleeding Risk Assessment  Pt denies epistaxis  Pt denies any hematuria  Pt denies any excessive or unusual bleeding/hematomas.  Pt denies any GI bleeds or hematemesis.  Pt denies any concerning daily headache or subdural hematoma symptoms.    Latest Hemoglobin: WNL, was low initially, came up with recheck.   Hemoglobin   Date Value Ref Range Status   11/05/2024 12.9 12.0 - 16.0 g/dL Final     Latest Platelets: WNL  Platelet Count   Date Value Ref Range Status   11/05/2024 253 164 - 446 K/uL Final          Creatinine Clearance/Renal Function  Latest CrCl: >60 ml/min      Hepatic function  Latest LFTs: WNL  Pt denies any history of liver dysfunction   Pt denies  ETOH overuse     Drug Interactions  ASA/other antiplatelets: None  NSAID: None  Other drug interactions: None  X Verified no anticonvulsant or azole therapy, education provided for future use.     Examination  Blood Pressure  Declined vitals at this visit  There were no vitals filed for this visit.  Symptomatic hypotension: Reports occasional dizziness  Significant gait impairment/imbalance/high risk for falls? None seen     Final  Assessment and Recommendations:  Patient appears stable from the anticoagulation standpoint.    Benefits of continued DOAC therapy outweigh risks for this patient  Recommend pt continue with current DOAC therapy: Eliquis 5 mg bid       Other Actions: CMP/CBC hemogram ordered prior to next visit    Follow up:  Will follow up with patient in 1 year.     Joann Kolb, PharmD

## 2025-08-15 ENCOUNTER — HOSPITAL ENCOUNTER (OUTPATIENT)
Dept: RADIOLOGY | Facility: MEDICAL CENTER | Age: 70
End: 2025-08-15
Payer: MEDICARE

## 2025-08-19 ENCOUNTER — APPOINTMENT (OUTPATIENT)
Dept: URBAN - METROPOLITAN AREA CLINIC 6 | Facility: CLINIC | Age: 70
Setting detail: DERMATOLOGY
End: 2025-08-19

## 2025-08-19 DIAGNOSIS — L81.4 OTHER MELANIN HYPERPIGMENTATION: ICD-10-CM

## 2025-08-19 DIAGNOSIS — D18.0 HEMANGIOMA: ICD-10-CM

## 2025-08-19 DIAGNOSIS — L65.9 NONSCARRING HAIR LOSS, UNSPECIFIED: ICD-10-CM

## 2025-08-19 DIAGNOSIS — L57.8 OTHER SKIN CHANGES DUE TO CHRONIC EXPOSURE TO NONIONIZING RADIATION: ICD-10-CM

## 2025-08-19 DIAGNOSIS — L90.5 SCAR CONDITIONS AND FIBROSIS OF SKIN: ICD-10-CM

## 2025-08-19 DIAGNOSIS — Z71.89 OTHER SPECIFIED COUNSELING: ICD-10-CM

## 2025-08-19 DIAGNOSIS — L82.1 OTHER SEBORRHEIC KERATOSIS: ICD-10-CM

## 2025-08-19 DIAGNOSIS — D22 MELANOCYTIC NEVI: ICD-10-CM

## 2025-08-19 PROBLEM — D22.61 MELANOCYTIC NEVI OF RIGHT UPPER LIMB, INCLUDING SHOULDER: Status: ACTIVE | Noted: 2025-08-19

## 2025-08-19 PROBLEM — D22.72 MELANOCYTIC NEVI OF LEFT LOWER LIMB, INCLUDING HIP: Status: ACTIVE | Noted: 2025-08-19

## 2025-08-19 PROBLEM — D22.39 MELANOCYTIC NEVI OF OTHER PARTS OF FACE: Status: ACTIVE | Noted: 2025-08-19

## 2025-08-19 PROBLEM — D18.01 HEMANGIOMA OF SKIN AND SUBCUTANEOUS TISSUE: Status: ACTIVE | Noted: 2025-08-19

## 2025-08-19 PROBLEM — D22.71 MELANOCYTIC NEVI OF RIGHT LOWER LIMB, INCLUDING HIP: Status: ACTIVE | Noted: 2025-08-19

## 2025-08-19 PROBLEM — D22.62 MELANOCYTIC NEVI OF LEFT UPPER LIMB, INCLUDING SHOULDER: Status: ACTIVE | Noted: 2025-08-19

## 2025-08-19 PROBLEM — D22.5 MELANOCYTIC NEVI OF TRUNK: Status: ACTIVE | Noted: 2025-08-19

## 2025-08-19 PROCEDURE — ? ADDITIONAL NOTES

## 2025-08-19 PROCEDURE — ? PRESCRIPTION

## 2025-08-19 PROCEDURE — ? MEDICATION COUNSELING

## 2025-08-19 PROCEDURE — ? SUNSCREEN TREATMENT REGIMEN

## 2025-08-19 PROCEDURE — ? COUNSELING

## 2025-08-19 RX ORDER — TRETIONIN 0.25 MG/G
CREAM TOPICAL
Qty: 45 | Refills: 11 | Status: ERX | COMMUNITY
Start: 2025-08-19

## 2025-08-19 RX ADMIN — TRETIONIN: 0.25 CREAM TOPICAL at 00:00

## 2025-08-19 ASSESSMENT — LOCATION DETAILED DESCRIPTION DERM
LOCATION DETAILED: LOWER STERNUM
LOCATION DETAILED: LEFT INFERIOR UPPER BACK
LOCATION DETAILED: LEFT SUPERIOR MEDIAL UPPER BACK
LOCATION DETAILED: MIDDLE STERNUM
LOCATION DETAILED: EPIGASTRIC SKIN
LOCATION DETAILED: LEFT DISTAL POSTERIOR UPPER ARM
LOCATION DETAILED: LEFT MEDIAL UPPER BACK
LOCATION DETAILED: RIGHT MEDIAL FOREHEAD
LOCATION DETAILED: LEFT ANTERIOR DISTAL UPPER ARM
LOCATION DETAILED: RIGHT ANTERIOR DISTAL UPPER ARM
LOCATION DETAILED: RIGHT DISTAL POSTERIOR UPPER ARM
LOCATION DETAILED: LEFT INFERIOR MEDIAL FOREHEAD
LOCATION DETAILED: RIGHT ANTERIOR PROXIMAL THIGH
LOCATION DETAILED: MID-FRONTAL SCALP
LOCATION DETAILED: INFERIOR THORACIC SPINE
LOCATION DETAILED: LEFT ANTERIOR PROXIMAL THIGH
LOCATION DETAILED: SUPERIOR THORACIC SPINE

## 2025-08-19 ASSESSMENT — LOCATION ZONE DERM
LOCATION ZONE: SCALP
LOCATION ZONE: FACE
LOCATION ZONE: LEG
LOCATION ZONE: TRUNK
LOCATION ZONE: ARM

## 2025-08-19 ASSESSMENT — LOCATION SIMPLE DESCRIPTION DERM
LOCATION SIMPLE: RIGHT THIGH
LOCATION SIMPLE: LEFT FOREHEAD
LOCATION SIMPLE: UPPER BACK
LOCATION SIMPLE: LEFT POSTERIOR UPPER ARM
LOCATION SIMPLE: ANTERIOR SCALP
LOCATION SIMPLE: ABDOMEN
LOCATION SIMPLE: CHEST
LOCATION SIMPLE: LEFT THIGH
LOCATION SIMPLE: RIGHT FOREHEAD
LOCATION SIMPLE: LEFT UPPER BACK
LOCATION SIMPLE: LEFT UPPER ARM
LOCATION SIMPLE: RIGHT POSTERIOR UPPER ARM
LOCATION SIMPLE: RIGHT UPPER ARM

## 2025-08-20 ENCOUNTER — HOSPITAL ENCOUNTER (OUTPATIENT)
Dept: RADIOLOGY | Facility: MEDICAL CENTER | Age: 70
End: 2025-08-20
Attending: INTERNAL MEDICINE
Payer: MEDICARE

## 2025-08-20 VITALS — BODY MASS INDEX: 27.32 KG/M2 | HEIGHT: 66 IN | WEIGHT: 170 LBS

## 2025-08-20 DIAGNOSIS — Z12.31 VISIT FOR SCREENING MAMMOGRAM: ICD-10-CM

## 2025-08-20 PROCEDURE — 77067 SCR MAMMO BI INCL CAD: CPT

## 2025-08-20 ASSESSMENT — FIBROSIS 4 INDEX: FIB4 SCORE: 1.61

## 2025-08-29 ENCOUNTER — APPOINTMENT (OUTPATIENT)
Dept: URBAN - METROPOLITAN AREA CLINIC 15 | Facility: CLINIC | Age: 70
Setting detail: DERMATOLOGY
End: 2025-08-29

## 2025-08-29 DIAGNOSIS — Z41.9 ENCOUNTER FOR PROCEDURE FOR PURPOSES OTHER THAN REMEDYING HEALTH STATE, UNSPECIFIED: ICD-10-CM

## 2025-08-29 PROCEDURE — ? COSMETIC CONSULTATION: GENERAL

## 2025-11-04 ENCOUNTER — APPOINTMENT (OUTPATIENT)
Dept: MEDICAL GROUP | Facility: LAB | Age: 70
End: 2025-11-04
Payer: MEDICARE

## (undated) DEVICE — GOWN WARMING STANDARD FLEX - (30/CA)

## (undated) DEVICE — SODIUM CHL IRRIGATION 0.9% 1000ML (12EA/CA)

## (undated) DEVICE — DRAPE STRLE REG TOWEL 18X24 - (10/BX 4BX/CA)"

## (undated) DEVICE — CHLORAPREP 26 ML APPLICATOR - ORANGE TINT(25/CA)

## (undated) DEVICE — BANDAGE STERILE 2 IN X 75 IN (12EA/BX 8BX/CA)

## (undated) DEVICE — PROTECTOR ULNA NERVE - (36PR/CA)

## (undated) DEVICE — SODIUM CHL. IRRIGATION 0.9% 3000ML (4EA/CA 65CA/PF)

## (undated) DEVICE — GLOVE BIOGEL PI ORTHO SZ 7.5 PF LF (40PR/BX)

## (undated) DEVICE — NEPTUNE 4 PORT MANIFOLD - (20/PK)

## (undated) DEVICE — TUBING PUMP WITH CONNECTOR REDEUCE (1EA)

## (undated) DEVICE — GLOVE BIOGEL SZ 6.5 SURGICAL PF LTX (50PR/BX 4BX/CA)

## (undated) DEVICE — HEAD HOLDER JUNIOR/ADULT

## (undated) DEVICE — HUMID-VENT HEAT AND MOISTURE EXCHANGE- (50/BX)

## (undated) DEVICE — SUCTION INSTRUMENT YANKAUER BULBOUS TIP W/O VENT (50EA/CA)

## (undated) DEVICE — GLOVE BIOGEL PI ULTRATOUCH SZ 7.5 SURGICAL PF LF -(50/BX 4BX/CA)

## (undated) DEVICE — WATER IRRIGATION STERILE 1000ML (12EA/CA)

## (undated) DEVICE — TUBE CONNECTING SUCTION - CLEAR PLASTIC STERILE 72 IN (50EA/CA)

## (undated) DEVICE — PAD PREP 24 X 48 CUFFED - (100/CA)

## (undated) DEVICE — PACK KNEE ARTHROSCOPY SM OR - (2EA/CA)

## (undated) DEVICE — PACK LOWER EXTREMITY - (2/CA)

## (undated) DEVICE — LEAD SET 6 DISP. EKG NIHON KOHDEN

## (undated) DEVICE — ELECTRODE 850 FOAM ADHESIVE - HYDROGEL RADIOTRNSPRNT (50/PK)

## (undated) DEVICE — PADDING CAST 6 IN STERILE - 6 X 4 YDS (24/CA)

## (undated) DEVICE — CANISTER SUCTION RIGID RED 1500CC (40EA/CA)

## (undated) DEVICE — BLADE SHAVER AGGRESSIVE PLUS 4.0MM ANGLED (5EA/BX)

## (undated) DEVICE — LACTATED RINGERS INJ 1000 ML - (14EA/CA 60CA/PF)

## (undated) DEVICE — AGEE CARPAL TUNNEL RELEASE (6EA/PK)

## (undated) DEVICE — MASK ANESTHESIA ADULT  - (100/CA)

## (undated) DEVICE — CANISTER SUCTION 3000ML MECHANICAL FILTER AUTO SHUTOFF MEDI-VAC NONSTERILE LF DISP  (40EA/CA)

## (undated) DEVICE — KIT CARPAL TUNNEL ENDOSCOPIC

## (undated) DEVICE — DRESSING 3X8 ADAPTIC GAUZE - NON-ADHERING (36/PK 6PK/BX)

## (undated) DEVICE — GLOVE BIOGEL INDICATOR SZ 6.5 SURGICAL PF LTX - (50PR/BX 4BX/CA)

## (undated) DEVICE — GLOVE BIOGEL PI INDICATOR SZ 6.5 SURGICAL PF LF - (50/BX 4BX/CA)

## (undated) DEVICE — ELECTRODE DUAL RETURN W/ CORD - (50/PK)

## (undated) DEVICE — TOURNIQUET, STERILE 18 (RED)

## (undated) DEVICE — SUTURE GENERAL

## (undated) DEVICE — DETERGENT RENUZYME PLUS 10 OZ PACKET (50/BX)

## (undated) DEVICE — TRAY SKIN SCRUB PVP WET (20EA/CA) PART #DYND70356 DISCONTINUED

## (undated) DEVICE — GLOVE, LITE (PAIR)

## (undated) DEVICE — SET LEADWIRE 5 LEAD BEDSIDE DISPOSABLE ECG (1SET OF 5/EA)

## (undated) DEVICE — BLADE SURGICAL #15 - (50/BX 3BX/CA)

## (undated) DEVICE — GLOVE BIOGEL ECLIPSE PF LATEX SIZE 7.5

## (undated) DEVICE — ANTI-FOG SOLUTION - 60BTL/CA

## (undated) DEVICE — TOURNIQUET CUFF 34 X 4 ONE PORT DISP - STERILE (10/BX)

## (undated) DEVICE — TUBING CLEARLINK DUO-VENT - C-FLO (48EA/CA)

## (undated) DEVICE — BANDAGE ELASTIC STERILE VELCRO 6 X 5 YDS (25EA/CA)

## (undated) DEVICE — BAG, SPONGE COUNT 50600

## (undated) DEVICE — GLOVE BIOGEL SZ 8 SURGICAL PF LTX - (50PR/BX 4BX/CA)

## (undated) DEVICE — MASK, LARYNGEAL AIRWAY #4

## (undated) DEVICE — TUBING PATIENT W/CONNECTOR REDEUCE (1EA)

## (undated) DEVICE — KIT ROOM DECONTAMINATION

## (undated) DEVICE — MASK AIRWAY FLEXIBLE SINGLE-USE SIZE 4 ADULTS (10EA/BX)

## (undated) DEVICE — BANDAGE ELASTIC 4 HONEYCOMB - 4"X5YD LF (20/CA)"

## (undated) DEVICE — SUTURE 4-0 ETHILON FS-2 18 (36PK/BX)"

## (undated) DEVICE — GLOVE BIOGEL SZ 7 SURGICAL PF LTX - (50PR/BX 4BX/CA)

## (undated) DEVICE — BANDAGE ELASTIC 6 IN X 5 YDS - LATEX FREE (10/BX)

## (undated) DEVICE — KIT ANESTHESIA W/CIRCUIT & 3/LT BAG W/FILTER (20EA/CA)

## (undated) DEVICE — SET EXTENSION WITH 2 PORTS (48EA/CA) ***PART #2C8610 IS A SUBSTITUTE*****

## (undated) DEVICE — SENSOR SPO2 NEO LNCS ADHESIVE (20/BX) SEE USER NOTES

## (undated) DEVICE — WATER IRRIG. STER. 1500 ML - (9/CA)

## (undated) DEVICE — SPONGE GAUZESTER 4 X 4 4PLY - (128PK/CA)